# Patient Record
Sex: FEMALE | Race: BLACK OR AFRICAN AMERICAN | NOT HISPANIC OR LATINO | Employment: UNEMPLOYED | URBAN - METROPOLITAN AREA
[De-identification: names, ages, dates, MRNs, and addresses within clinical notes are randomized per-mention and may not be internally consistent; named-entity substitution may affect disease eponyms.]

---
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
## 2024-01-04 ENCOUNTER — TELEPHONE (OUTPATIENT)
Dept: PSYCHIATRY | Facility: CLINIC | Age: 19
End: 2024-01-04
Payer: OTHER GOVERNMENT

## 2024-01-04 NOTE — TELEPHONE ENCOUNTER
----- Message from Charis Childress, PhD, MPAP sent at 1/4/2024 10:54 AM CST -----  Regarding: reschedule  Please contact pt to reschedule her visit--we have a medicine section committee meeting that morning from 7-8, and they have requested that everyone attend in person or via zoom. My schedule is already blocked--I just need Jaia moved. Thanks!

## 2024-01-10 ENCOUNTER — TELEPHONE (OUTPATIENT)
Dept: PSYCHIATRY | Facility: CLINIC | Age: 19
End: 2024-01-10
Payer: OTHER GOVERNMENT

## 2024-01-12 ENCOUNTER — PATIENT MESSAGE (OUTPATIENT)
Dept: PSYCHIATRY | Facility: CLINIC | Age: 19
End: 2024-01-12

## 2024-01-12 ENCOUNTER — TELEPHONE (OUTPATIENT)
Dept: PSYCHIATRY | Facility: CLINIC | Age: 19
End: 2024-01-12
Payer: OTHER GOVERNMENT

## 2024-01-12 NOTE — TELEPHONE ENCOUNTER
----- Message from Charis Childress, PhD, MPAP sent at 1/12/2024 10:33 AM CST -----  Regarding: johan Frazier was late logging in for her visit--please call to johan. Thanks

## 2024-01-17 DIAGNOSIS — F41.9 ANXIETY: ICD-10-CM

## 2024-01-17 DIAGNOSIS — F33.1 MAJOR DEPRESSIVE DISORDER, RECURRENT, MODERATE: ICD-10-CM

## 2024-01-25 ENCOUNTER — TELEPHONE (OUTPATIENT)
Dept: PSYCHIATRY | Facility: CLINIC | Age: 19
End: 2024-01-25
Payer: OTHER GOVERNMENT

## 2024-01-29 ENCOUNTER — OFFICE VISIT (OUTPATIENT)
Dept: PSYCHIATRY | Facility: CLINIC | Age: 19
End: 2024-01-29
Payer: COMMERCIAL

## 2024-01-29 DIAGNOSIS — F90.2 ATTENTION DEFICIT HYPERACTIVITY DISORDER (ADHD), COMBINED TYPE: ICD-10-CM

## 2024-01-29 DIAGNOSIS — F33.1 MODERATE EPISODE OF RECURRENT MAJOR DEPRESSIVE DISORDER: Primary | ICD-10-CM

## 2024-01-29 DIAGNOSIS — F41.9 ANXIETY: ICD-10-CM

## 2024-01-29 PROCEDURE — 90863 PHARMACOLOGIC MGMT W/PSYTX: CPT | Mod: 95,,, | Performed by: PSYCHOLOGIST

## 2024-01-29 PROCEDURE — 90832 PSYTX W PT 30 MINUTES: CPT | Mod: 95,,, | Performed by: PSYCHOLOGIST

## 2024-01-29 RX ORDER — LISDEXAMFETAMINE DIMESYLATE 60 MG/1
60 CAPSULE ORAL EVERY MORNING
Qty: 30 CAPSULE | Refills: 0 | Status: SHIPPED | OUTPATIENT
Start: 2024-01-29 | End: 2024-03-16

## 2024-01-29 RX ORDER — ESCITALOPRAM OXALATE 10 MG/1
10 TABLET ORAL DAILY
Qty: 90 TABLET | Refills: 1 | Status: SHIPPED | OUTPATIENT
Start: 2024-01-29 | End: 2024-07-27

## 2024-01-29 RX ORDER — LISDEXAMFETAMINE DIMESYLATE 60 MG/1
60 CAPSULE ORAL EVERY MORNING
Qty: 30 CAPSULE | Refills: 0 | Status: SHIPPED | OUTPATIENT
Start: 2024-02-28 | End: 2024-03-29

## 2024-01-29 RX ORDER — LISDEXAMFETAMINE DIMESYLATE 60 MG/1
60 CAPSULE ORAL EVERY MORNING
Qty: 30 CAPSULE | Refills: 0 | Status: SHIPPED | OUTPATIENT
Start: 2024-03-29 | End: 2024-04-28

## 2024-01-29 RX ORDER — ESCITALOPRAM OXALATE 10 MG/1
10 TABLET ORAL
Qty: 30 TABLET | Refills: 2 | OUTPATIENT
Start: 2024-01-29

## 2024-01-29 NOTE — LETTER
January 29, 2024    Freddie Fenton  [address]  [place] [address]             [facility]  Psychiatry  [address] [facility] [address] [facility] [address] [place] [address]  Phone: [phone]  Fax: [phone]   January 29, 2024     Patient: Freddie Fenton   YOB: 2005   Date of Visit: 1/29/2024       To Whom it May Concern:    Freddie Fenton was seen in my clinic on 1/29/2024. She may return to school on 1/30/24 .    Please excuse her from any classes or work missed.    If you have any questions or concerns, please don't hesitate to call.    Sincerely,     Charis Childress, PhD, MPAP  Advanced Practice Medical Psychologist

## 2024-01-29 NOTE — PROGRESS NOTES
Outpatient Psychiatry Follow-Up Visit    1/29/2024    Virtual Visit    The patient location is: Patient's dorm/ Patient reported that his/her location at the time of this visit was in the [facility]     Visit type: Virtual visit with synchronous audio and video     Each patient to whom he or she provides medical services by telehealth is: (1) informed of the relationship between the medical psychologist and patient and the respective role of any other health care provider with respect to management of the patient; and (2) notified that he or she may decline to receive medical services by telehealth and may withdraw from such care at any time.    I also informed patient of the following:   Charis Childress, PhD, MPAP:  [place] medical license number: [id number]    My contact info:  [facility] / 2nd Floor  [address]  [place] [address]   Ph: [phone]    If technology issues, call office phone: Ph: [phone] or [phone]  If crisis: Dial 911 or go to nearest Emergency Room (ER)  If questions related to privacy practices: contact [facility] Information Department: [phone]    Chief Complaint:  Freddie Fenton is a 18 y.o. female who presents today for follow-up of depression, anxiety, and inattention/distractibility .       Preferred Name: Freddie  Gender Identity: cis female  Preferred Pronouns: she/her    Impressions/Plan from last visit: Freddie (Deonte--a) attended her visit.  She has completely resigned from school--is appealing to get her TOPS back. After our last visit, she saw the counselor on campus and was taken to the hospital for evaluation. She was not hospitalized. She told her parents--her dad was reportedly upset that she had not told them that she had gone to the hospital. Her mom has not really said much. Her mom reportedly wants her to stay at home and commute; she wants to stay on campus so she will have more social interactions. 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 Her boyfriend is reportedly working two jobs--she said that they have discussed her leaving her phone on campus and staying with him on Thursday nights, then going home on the weekend--twice a month.     We worked on a letter for an exception. Please see attached.    We also discussed sleep difficulty. Her schedule has been off--also not eating well. She has relapsed with cutting--has cut twice since her hospital evaluation--the first time after the hospital; the second time was two nights ago. She pulled up her sleeve to show faint lines on the inside of her left arm. She denied active thoughts of killing herself--has had "what if" thoughts--denied a plan or intent. She also brought up problems with eating--has vomited some after eating; later said that she has binged. Vyvanse decreases her appetite, and she is used to going long periods without eating. She reported that she has not been taking Vyvanse--her mom has no trust in her. She has been in trouble for vaping, lying, etc. She is not able to leave her house on her own, even to walk in the neighborhood. There are multiple factors contributing to her current levels of distress, some of which are due to her own doing. See plan below.    Plan--continue Vyvanse 60 mg (no scripts needed); continue Lexapro 10 mg; sleep hygiene--get sleep schedule back on track; https://www.veterantraining.[place].gov[place]sleep[place]index.asp; https://[employer]Worlds.[place].gov/steve/cbt-i-    Interval History and Content of Current Session: Freddie (Deonte--eber) attended her virtual visit. She reported that she has been sleeping a lot. 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 Her sleep cycle was reversed. She is back in school--has an 8 am class and 9:30 am on other days. She has been doing better with it. She reported that she still feels well-rested. She recently restarted Vyvanse. She reported that she " "sometimes forgets to take Lexapro because of her sleep schedule. She said that she looked at the [facility] resources--helped some. Freddie reported that she thinks that some of her trouble is because her mom got rid of her dog and she misses her. She and her mom are getting along "sometimes". She is "hot and cold" sometimes. Freddie and her boyfriend are still together, though they don't get to see each other. She reported that when she would ask to see him, her mom would say "yes."  He has met the rest of her family at [facility]. She believes that her mood/depression is up and down--sometimes mild and other times moderate. She is planning to get counseling on campus at [facility]. She reported that she has been binge-eating a little more--we discussed taking Vyvanse daily rather than just for classes. She asked about her symptoms worsening for now having her dog--discussed other things she could do like talk to a friend, go for a walk, volunteer at a shelter, etc. See plan below.    Plan--continue Lexapro 10 mg; Vyvanse 60 mg (sent 3 scripts); sleep hygiene    Mom (Tito)  Dad (Yobani)     since October 2023.        PSYCHOTHERAPY      Site: [facility]  Time: 16 minutes  Participants: Met with patient    Therapeutic Intervention Type: behavior modifying psychotherapy, supportive psychotherapy  Why chosen therapy is appropriate versus another modality: patient responds to this modality, evidence based practice    Target symptoms: depression, lack of focus, anxiety , relational  Primary focus: see above    Outcome monitoring methods: self-report, observation    Patient's response to intervention:  The patient's response to intervention is accepting.    Progress toward goals:  The patient's progress toward goals is fair .  ---------------------------------------------------------------------------------------------------  Prior medicines: ZoloftHeverictal        1/29/2024    10:39 AM 1/8/2024    11:30 AM " 11/14/2023     4:01 PM   GAD7   1. Feeling nervous, anxious, or on edge? 3 3 3   2. Not being able to stop or control worrying? 3 3 3   3. Worrying too much about different things? 3 3 3   4. Trouble relaxing? 1 3 3   5. Being so restless that it is hard to sit still? 1 0 0   6. Becoming easily annoyed or irritable? 1 3 0   7. Feeling afraid as if something awful might happen? 3 3 3   JOE-7 Score 15 18 15      0-4 = Minimal anxiety  5-9 = Mild anxiety  10-14 = Moderate anxiety  15-21 = Severe anxiety           10/26/2021     4:00 PM   Depression Patient Health Questionnaire   Over the last two weeks how often have you been bothered by little interest or pleasure in doing things Not at all   Over the last two weeks how often have you been bothered by feeling down, depressed or hopeless Several days   PHQ-2 Total Score 1   Over the last two weeks how often have you been bothered by trouble falling or staying asleep, or sleeping too much Several days   Over the last two weeks how often have you been bothered by feeling tired or having little energy Not at all   Over the last two weeks how often have you been bothered by a poor appetite or overeating Nearly every day   Over the last two weeks how often have you been bothered by feeling bad about yourself - or that you are a failure or have let yourself or your family down More than half the days   Over the last two weeks how often have you been bothered by trouble concentrating on things, such as reading the newspaper or watching television Not at all   Over the last two weeks how often have you been bothered by moving or speaking so slowly that other people could have noticed. Or the opposite - being so fidgety or restless that you have been moving around a lot more than usual. More than half the days   Over the last two weeks how often have you been bothered by thoughts that you would be better off dead, or of hurting yourself Several days   If you checked off any  "problems, how difficult have these problems made it for you to do your work, take care of things at home or get along with other people? Not difficult at all   PHQ-9 Score 10   PHQ-9 Interpretation Moderate     0-4 = No intervention  5 to 9 = Mild  10 to 14 = Moderate  15 to 19 = Moderately severe  =20 = Severe    Review of Systems   PSYCHIATRIC: Pertinant items are noted in the narrative.    Past Medical, Family and Social History: The patient's past medical, family and social history have been reviewed and updated as appropriate within the electronic medical record - see encounter notes.      Current Outpatient Medications:     EScitalopram oxalate (LEXAPRO) 10 MG tablet, Take 1 tablet (10 mg total) by mouth once daily., Disp: 30 tablet, Rfl: 2    lisdexamfetamine (VYVANSE) 60 MG capsule, Take 1 capsule (60 mg total) by mouth every morning., Disp: 30 capsule, Rfl: 0    norgestimate-ethinyl estradioL (ORTHO TRI-CYCLEN LO) 0.18/0.215/0.25 mg-25 mcg tablet, Take 1 tablet by mouth once daily., Disp: 30 tablet, Rfl: 11    Compliance: partial    Side effects: see above    Risk Parameters:  Patient reports no suicidal ideation  Patient reports no homicidal ideation  Patient reports no self-injurious behavior  Patient reports no violent behavior    Exam (detailed: at least 9 elements; comprehensive: all 15 elements)   Constitutional  Vitals:  Most recent vital signs were reviewed.   Last 3 sets of Vitals        8/10/2023     4:10 PM 11/15/2023     8:24 AM 12/21/2023    10:16 AM   Vitals - 1 value per visit   SYSTOLIC 120 118 110   DIASTOLIC 74 84 80   Pulse 119 75 79   Temp 99.1 °F (37.3 °C)  97.4 °F (36.3 °C)   SPO2 99 %  97 %   Weight (lb) 163.58 172.62 177.25   Weight (kg) 74.2 78.3 80.4   Height 5' 7.5" (1.715 m)  5' 7" (1.702 m)   BMI (Calculated) 25.2  27.8   Pain Score Zero  Zero          General:  age appropriate, casually dressed, neatly groomed, scarf over head/hair     Musculoskeletal  Muscle Strength/Tone:  no " tremor, no tic   Gait & Station:  video visit     Psychiatric  Speech:  no latency; no press   Behavior: wnl   Mood & Affect:  okay  congruent and appropriate   Thought Process:  normal and logical   Associations:  intact   Thought Content:  normal, no suicidality, no homicidality, delusions, or paranoia   Insight:  has awareness of illness   Judgement: behavior is adequate to circumstances   Orientation:  grossly intact   Memory: intact for content of interview   Language: grossly intact   Attention Span & Concentration:  Grossly intact   Fund of Knowledge:  intact and appropriate to age and level of education     Assessment and Diagnosis   Status/Progress: Based on the examination today, the patient's problem(s) is/are adequately but not ideally controlled.  New problems have not been presented today.   Co-morbidities and psychosocial stressors  are complicating management of the primary condition.  There are no active rule-out diagnoses for this patient at this time.     General Impression:     Encounter Diagnoses   Name Primary?    Moderate episode of recurrent major depressive disorder Yes    Attention deficit hyperactivity disorder (ADHD), combined type     Anxiety        Intervention/Counseling/Treatment Plan   Medication Management: Discussed risks, benefits, and alternatives to treatment plan documented above with patient. I answered all patient questions related to this plan, and patient expressed understanding and agreement.   continue Lexapro 10 mg; Vyvanse 60 mg (sent 3 scripts)  Counseling--will go on campus    Medication List with Changes/Refills   Current Medications    ESCITALOPRAM OXALATE (LEXAPRO) 10 MG TABLET    Take 1 tablet (10 mg total) by mouth once daily.    LISDEXAMFETAMINE (VYVANSE) 60 MG CAPSULE    Take 1 capsule (60 mg total) by mouth every morning.    NORGESTIMATE-ETHINYL ESTRADIOL (ORTHO TRI-CYCLEN LO) 0.18/0.215/0.25 MG-25 MCG TABLET    Take 1 tablet by mouth once daily.        Return  to Clinic: as scheduled    I spent an additional 9 minutes performing E/M services with >50% spent on counseling, guidance, coordinating care (not Psychotherapy related) in addition to the 16 minutes performing Psychotherapy.    Time spent with pt including note preparation: 25 minutes       Charis Childress, PhD, MP  Advanced Practice Medical Psychologist  [facility]  [address] [facility] [address]  [place] [address]  [phone]   [phone] fax

## 2024-01-29 NOTE — PATIENT INSTRUCTIONS

## 2024-02-13 ENCOUNTER — TELEPHONE (OUTPATIENT)
Dept: PSYCHIATRY | Facility: CLINIC | Age: 19
End: 2024-02-13
Payer: OTHER GOVERNMENT

## 2024-02-19 ENCOUNTER — OFFICE VISIT (OUTPATIENT)
Dept: PSYCHIATRY | Facility: CLINIC | Age: 19
End: 2024-02-19
Payer: OTHER GOVERNMENT

## 2024-02-19 DIAGNOSIS — F33.1 MODERATE EPISODE OF RECURRENT MAJOR DEPRESSIVE DISORDER: Primary | ICD-10-CM

## 2024-02-19 DIAGNOSIS — F90.2 ATTENTION DEFICIT HYPERACTIVITY DISORDER (ADHD), COMBINED TYPE: ICD-10-CM

## 2024-02-19 DIAGNOSIS — F41.9 ANXIETY: ICD-10-CM

## 2024-02-19 PROCEDURE — 90832 PSYTX W PT 30 MINUTES: CPT | Mod: 95,,, | Performed by: PSYCHOLOGIST

## 2024-02-19 PROCEDURE — 90863 PHARMACOLOGIC MGMT W/PSYTX: CPT | Mod: 95,,, | Performed by: PSYCHOLOGIST

## 2024-02-19 RX ORDER — TRAZODONE HYDROCHLORIDE 50 MG/1
50 TABLET ORAL NIGHTLY
Qty: 30 TABLET | Refills: 2 | Status: SHIPPED | OUTPATIENT
Start: 2024-02-19 | End: 2024-05-19

## 2024-02-19 NOTE — PATIENT INSTRUCTIONS

## 2024-02-19 NOTE — PROGRESS NOTES
"Outpatient Psychiatry Follow-Up Visit    2/19/2024      Virtual Visit    The patient location is: Patient's dorm/ Patient reported that his/her location at the time of this visit was in the [facility]     Visit type: Virtual visit with synchronous audio and video     Each patient to whom he or she provides medical services by telehealth is: (1) informed of the relationship between the medical psychologist and patient and the respective role of any other health care provider with respect to management of the patient; and (2) notified that he or she may decline to receive medical services by telehealth and may withdraw from such care at any time.    I also informed patient of the following:   Charis Childress, PhD, MPAP:  [place] medical license number: [id number]    My contact info:  [facility] / 2nd Floor  [address]  [place] [address]   Ph: [phone]    If technology issues, call office phone: Ph: [phone] or [phone]  If crisis: Dial 911 or go to nearest Emergency Room (ER)  If questions related to privacy practices: contact [facility] Information Department: [phone]    Chief Complaint:  Freddie Fenton is a 18 y.o. female who presents today for follow-up of depression, anxiety, and inattention/distractibility .       Preferred Name: Freddie  Gender Identity: cis female  Preferred Pronouns: she/her    Impressions/Plan from last visit: Freddie (Deonte--beer) attended her virtual visit. She reported that she has been sleeping a lot. 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 Her sleep cycle was reversed. She is back in school--has an 8 am class and 9:30 am on other days. She has been doing better with it. She reported that she still feels well-rested. She recently restarted Vyvanse. She reported that she sometimes forgets to take Lexapro because of her sleep schedule. She said that she [facility]--[facility] some. Freddie " "reported that she thinks that some of her trouble is because her mom got rid of her dog and she misses her. She and her mom are getting along "sometimes". She is "hot and cold" sometimes. Freddie and her boyfriend are still together, though they don't get to see each other. She reported that when she would ask to see him, her mom would say "yes."  He has met the rest of her family at [facility]. She believes that her mood/depression is up and down--sometimes mild and other times moderate. She is planning to get counseling on campus at [facility]. She reported that she has been binge-eating a little more--we discussed taking Vyvanse daily rather than just for classes. She asked about her symptoms worsening for now having her dog--discussed other things she could do like talk to a friend, go for a walk, volunteer at a shelter, etc. See plan below.    Plan--continue Lexapro 10 mg; Vyvanse 60 mg (sent 3 scripts); sleep hygiene    Interval History and Content of Current Session: Freddie (Deonte--a) attended her virtual visit. She is back on campus and has started classes. She reported that she is sometimes not getting up for her 8 am classes (Mondays and Wednesdays). She reported that she has a 9:30 class everyday--is up by then. She said that she is taking her medicine, but sometimes feels sick. Anxiety is still very high. She has had trouble with sleep sometimes--still takes hydroxyzine sometimes but it's not always helpful.  We discussed the drug drug interaction of Lexapro and Vistaril, and we agreed that she will try trazodone instead when needed.  There are also drug drug interactions with Lexapro and trazodone, but not the cardiac influence.  We spent some time discussing sleep hygiene in general.  When she does go to sleep, she does not usually have a problem staying asleep for at least 7 hours.  Her schedule is just off on some days. She reported that she is not eating regularly--we talked about regulating her food " habits. She said that she will often go without eating at all. When she would try to eat, she would get nauseated and vomit. She admitted to making herself vomit about once a month before when she would over-eat. She said that she was sick last week over Mardi Gras and did make herself vomit last Wednesday.  She said that she thought in December she might be pregnant and took an over-the-counter test, as well as saw medical and took a blood test.  They were negative, and she denied having unprotected sex since that time.  We also spent time discussing eating habits and if this continues to be an issue, she will reach out to medical for further testing.  See plan below.    Plan--continue Lexapro 10 mg; Vyvanse 60 mg (no script needed--will try a lower dose by dissolving); trial of trazodone 50 mg hs prn; sleep hygiene; message me about Vyvanse dose    Mom (Tito)   Dad (Yobani)     since October 2023.        PSYCHOTHERAPY      Site: [facility]  Time: 16 minutes  Participants: Met with patient    Therapeutic Intervention Type: behavior modifying psychotherapy, supportive psychotherapy  Why chosen therapy is appropriate versus another modality: patient responds to this modality, evidence based practice    Target symptoms: depression, lack of focus, anxiety , sleep  Primary focus: see above    Outcome monitoring methods: self-report, observation    Patient's response to intervention:  The patient's response to intervention is accepting.    Progress toward goals:  The patient's progress toward goals is fair .  ---------------------------------------------------------------------------------------------------  Prior medicines: Zoloft, Lamictal        2/19/2024     2:02 AM 2/15/2024    11:45 AM 1/29/2024    10:39 AM   GAD7   1. Feeling nervous, anxious, or on edge? 3 3 3   2. Not being able to stop or control worrying? 3 3 3   3. Worrying too much about different things? 3 3 3   4. Trouble relaxing? 3 3  1   5. Being so restless that it is hard to sit still? 3 3 1   6. Becoming easily annoyed or irritable? 3 3 1   7. Feeling afraid as if something awful might happen? 3 3 3   JOE-7 Score 21 21 15      0-4 = Minimal anxiety  5-9 = Mild anxiety  10-14 = Moderate anxiety  15-21 = Severe anxiety           10/26/2021     4:00 PM   Depression Patient Health Questionnaire   Over the last two weeks how often have you been bothered by little interest or pleasure in doing things Not at all   Over the last two weeks how often have you been bothered by feeling down, depressed or hopeless Several days   PHQ-2 Total Score 1   Over the last two weeks how often have you been bothered by trouble falling or staying asleep, or sleeping too much Several days   Over the last two weeks how often have you been bothered by feeling tired or having little energy Not at all   Over the last two weeks how often have you been bothered by a poor appetite or overeating Nearly every day   Over the last two weeks how often have you been bothered by feeling bad about yourself - or that you are a failure or have let yourself or your family down More than half the days   Over the last two weeks how often have you been bothered by trouble concentrating on things, such as reading the newspaper or watching television Not at all   Over the last two weeks how often have you been bothered by moving or speaking so slowly that other people could have noticed. Or the opposite - being so fidgety or restless that you have been moving around a lot more than usual. More than half the days   Over the last two weeks how often have you been bothered by thoughts that you would be better off dead, or of hurting yourself Several days   If you checked off any problems, how difficult have these problems made it for you to do your work, take care of things at home or get along with other people? Not difficult at all   PHQ-9 Score 10   PHQ-9 Interpretation Moderate     0-4 =  "No intervention  5 to 9 = Mild  10 to 14 = Moderate  15 to 19 = Moderately severe  =20 = Severe    Review of Systems   PSYCHIATRIC: Pertinant items are noted in the narrative.    Past Medical, Family and Social History: The patient's past medical, family and social history have been reviewed and updated as appropriate within the electronic medical record - see encounter notes.      Current Outpatient Medications:     EScitalopram oxalate (LEXAPRO) 10 MG tablet, Take 1 tablet (10 mg total) by mouth once daily., Disp: 90 tablet, Rfl: 1    lisdexamfetamine (VYVANSE) 60 MG capsule, Take 1 capsule (60 mg total) by mouth every morning., Disp: 30 capsule, Rfl: 0    [START ON 2/28/2024] lisdexamfetamine (VYVANSE) 60 MG capsule, Take 1 capsule (60 mg total) by mouth every morning., Disp: 30 capsule, Rfl: 0    [START ON 3/29/2024] lisdexamfetamine (VYVANSE) 60 MG capsule, Take 1 capsule (60 mg total) by mouth every morning., Disp: 30 capsule, Rfl: 0    norgestimate-ethinyl estradioL (ORTHO TRI-CYCLEN LO) 0.18/0.215/0.25 mg-25 mcg tablet, Take 1 tablet by mouth once daily., Disp: 30 tablet, Rfl: 11    Compliance: yes    Side effects: see above    Risk Parameters:  Patient reports no suicidal ideation  Patient reports no homicidal ideation  Patient reports no self-injurious behavior  Patient reports no violent behavior    Exam (detailed: at least 9 elements; comprehensive: all 15 elements)   Constitutional  Vitals:  Most recent vital signs were reviewed.   Last 3 sets of Vitals        8/10/2023     4:10 PM 11/15/2023     8:24 AM 12/21/2023    10:16 AM   Vitals - 1 value per visit   SYSTOLIC 120 118 110   DIASTOLIC 74 84 80   Pulse 119 75 79   Temp 99.1 °F (37.3 °C)  97.4 °F (36.3 °C)   SPO2 99 %  97 %   Weight (lb) 163.58 172.62 177.25   Weight (kg) 74.2 78.3 80.4   Height 5' 7.5" (1.715 m)  5' 7" (1.702 m)   BMI (Calculated) 25.2  27.8   Pain Score Zero  Zero          General:  age appropriate, casually dressed, neatly groomed "     Musculoskeletal  Muscle Strength/Tone:  no tremor, no tic   Gait & Station:  video visit     Psychiatric  Speech:  no latency; no press   Behavior: wnl   Mood & Affect:  anxious, a little sick  congruent and appropriate   Thought Process:  normal and logical   Associations:  intact   Thought Content:  normal, no suicidality, no homicidality, delusions, or paranoia   Insight:  has awareness of illness   Judgement: behavior is adequate to circumstances   Orientation:  grossly intact   Memory: intact for content of interview   Language: grossly intact   Attention Span & Concentration:  Grossly intact   Fund of Knowledge:  intact and appropriate to age and level of education     Assessment and Diagnosis   Status/Progress: Based on the examination today, the patient's problem(s) is/are adequately but not ideally controlled.  New problems have been presented today.   Co-morbidities and psychosocial stressors  are complicating management of the primary condition.  There are no active rule-out diagnoses for this patient at this time.     General Impression:     Encounter Diagnoses   Name Primary?    Moderate episode of recurrent major depressive disorder Yes    Attention deficit hyperactivity disorder (ADHD), combined type     Anxiety        Intervention/Counseling/Treatment Plan   Medication Management: Discussed risks, benefits, and alternatives to treatment plan documented above with patient. I answered all patient questions related to this plan, and patient expressed understanding and agreement.   continue Lexapro 10 mg; Vyvanse 60 mg (no script needed--will try a lower dose by dissolving); trial of trazodone 50 mg hs prn  Counseling--will go on campus  sleep hygiene;  message me about Vyvanse dose    Medication List with Changes/Refills   Current Medications    ESCITALOPRAM OXALATE (LEXAPRO) 10 MG TABLET    Take 1 tablet (10 mg total) by mouth once daily.    LISDEXAMFETAMINE (VYVANSE) 60 MG CAPSULE    Take 1  capsule (60 mg total) by mouth every morning.    LISDEXAMFETAMINE (VYVANSE) 60 MG CAPSULE    Take 1 capsule (60 mg total) by mouth every morning.    LISDEXAMFETAMINE (VYVANSE) 60 MG CAPSULE    Take 1 capsule (60 mg total) by mouth every morning.    NORGESTIMATE-ETHINYL ESTRADIOL (ORTHO TRI-CYCLEN LO) 0.18/0.215/0.25 MG-25 MCG TABLET    Take 1 tablet by mouth once daily.        Return to Clinic: 2 months    I spent an additional 20 minutes performing E/M services with >50% spent on counseling, guidance, coordinating care (not Psychotherapy related) in addition to the 16 minutes performing Psychotherapy.    Time spent with pt including note preparation: 36 minutes       Charis Childress, PhD, MP  Advanced Practice Medical Psychologist  [facility]  [address]  [place] [address]  [phone]   [phone] fax

## 2024-04-12 DIAGNOSIS — Z11.8 SCREENING FOR CHLAMYDIAL DISEASE: Primary | ICD-10-CM

## 2024-05-20 ENCOUNTER — PATIENT MESSAGE (OUTPATIENT)
Dept: PSYCHIATRY | Facility: CLINIC | Age: 19
End: 2024-05-20

## 2024-05-20 ENCOUNTER — OFFICE VISIT (OUTPATIENT)
Dept: PSYCHIATRY | Facility: CLINIC | Age: 19
End: 2024-05-20
Payer: OTHER GOVERNMENT

## 2024-05-20 DIAGNOSIS — F33.1 MODERATE EPISODE OF RECURRENT MAJOR DEPRESSIVE DISORDER: Primary | ICD-10-CM

## 2024-05-20 DIAGNOSIS — F90.2 ATTENTION DEFICIT HYPERACTIVITY DISORDER (ADHD), COMBINED TYPE: ICD-10-CM

## 2024-05-20 DIAGNOSIS — F41.9 ANXIETY: ICD-10-CM

## 2024-05-20 PROCEDURE — 90863 PHARMACOLOGIC MGMT W/PSYTX: CPT | Mod: 95,,, | Performed by: PSYCHOLOGIST

## 2024-05-20 PROCEDURE — 90832 PSYTX W PT 30 MINUTES: CPT | Mod: 95,,, | Performed by: PSYCHOLOGIST
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
## 2024-05-20 NOTE — PATIENT INSTRUCTIONS

## 2024-06-25 ENCOUNTER — PATIENT MESSAGE (OUTPATIENT)
Dept: PSYCHIATRY | Facility: CLINIC | Age: 19
End: 2024-06-25

## 2024-06-26 ENCOUNTER — OFFICE VISIT (OUTPATIENT)
Dept: PSYCHIATRY | Facility: CLINIC | Age: 19
End: 2024-06-26
Payer: COMMERCIAL

## 2024-06-26 ENCOUNTER — TELEPHONE (OUTPATIENT)
Dept: PSYCHIATRY | Facility: CLINIC | Age: 19
End: 2024-06-26

## 2024-06-26 DIAGNOSIS — F41.9 ANXIETY: ICD-10-CM

## 2024-06-26 DIAGNOSIS — F90.2 ATTENTION DEFICIT HYPERACTIVITY DISORDER (ADHD), COMBINED TYPE: ICD-10-CM

## 2024-06-26 DIAGNOSIS — F33.1 MODERATE EPISODE OF RECURRENT MAJOR DEPRESSIVE DISORDER: Primary | ICD-10-CM

## 2024-06-26 DIAGNOSIS — R40.4 STARING EPISODES: ICD-10-CM

## 2024-06-26 PROCEDURE — 90832 PSYTX W PT 30 MINUTES: CPT | Mod: 95,,, | Performed by: PSYCHOLOGIST

## 2024-06-26 PROCEDURE — 90863 PHARMACOLOGIC MGMT W/PSYTX: CPT | Mod: 95,,, | Performed by: PSYCHOLOGIST

## 2024-06-26 RX ORDER — ESCITALOPRAM OXALATE 10 MG/1
15 TABLET ORAL DAILY
Qty: 45 TABLET | Refills: 2 | Status: SHIPPED | OUTPATIENT
Start: 2024-06-26 | End: 2024-09-24

## 2024-06-26 RX ORDER — TRAZODONE HYDROCHLORIDE 50 MG/1
50 TABLET ORAL NIGHTLY
Qty: 30 TABLET | Refills: 2 | Status: SHIPPED | OUTPATIENT
Start: 2024-06-26 | End: 2024-09-24

## 2024-06-26 RX ORDER — LISDEXAMFETAMINE DIMESYLATE 60 MG/1
60 CAPSULE ORAL EVERY MORNING
Qty: 30 CAPSULE | Refills: 0 | Status: SHIPPED | OUTPATIENT
Start: 2024-07-26 | End: 2024-08-25

## 2024-06-26 RX ORDER — LISDEXAMFETAMINE DIMESYLATE 60 MG/1
60 CAPSULE ORAL EVERY MORNING
Qty: 30 CAPSULE | Refills: 0 | Status: SHIPPED | OUTPATIENT
Start: 2024-08-25 | End: 2024-09-24

## 2024-06-26 RX ORDER — LISDEXAMFETAMINE DIMESYLATE 60 MG/1
60 CAPSULE ORAL EVERY MORNING
Qty: 30 CAPSULE | Refills: 0 | Status: SHIPPED | OUTPATIENT
Start: 2024-06-26 | End: 2024-07-26
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
## 2024-06-26 NOTE — TELEPHONE ENCOUNTER
----- Message from Charis Childress, PhD, MPAP sent at 6/26/2024  3:04 PM CDT -----  Regarding: schedule  Please contact pt about scheduling with me in 6-8 weeks for a 30 min virtual visit.    thanks  
skill demonstration/individual instruction/verbal instruction

## 2024-06-26 NOTE — PATIENT INSTRUCTIONS

## 2024-08-21 NOTE — PROGRESS NOTES
"Subjective:      Patient ID: Freddie Fenton is a 18 y.o. female.    Chief Complaint:  " Seizures ".     HPI 18 Years old AA Female with PMHx of ADHD / Staring spells and others non pertinent Medical issues came for the evaluation and recommendation of " Seizures ".      Started: 2 months ago.   Describes: " staring ".   Timing: 15 - 20 seconds.   Frequency: twice.   Pain:  none.   Location: CNS.   Family: No Seizures.   Medications: Lexapro /Vyvanse / Trazodone.   Worsen: none.   Alleviated: by itself.   Associated symptoms: " eyes rolled back " / " daydreaming " /   Triggers: none.   Prodrome symptoms: none.          Referral by PCP.        Last spell July 2024.        No shaking.        No LOC or awareness.        Had a convulsion after an overdose - " Hydroxysizine ".           Review of Systems   Neurological:  Positive for seizures.   All other systems reviewed and are negative.    Objective:     Neurologic Exam     Mental Status   Oriented to person, place, and time.   Registration: recalls 3 of 3 objects. Recall at 5 minutes: recalls 3 of 3 objects. Follows 3 step commands.   Attention: normal. Concentration: normal.   Speech: speech is normal   Level of consciousness: alert  Knowledge: good. Able to perform simple calculations.   Able to name object. Able to read. Able to write. Normal comprehension.     Cranial Nerves   Cranial nerves II through XII intact.     CN III, IV, VI   Pupils are equal, round, and reactive to light.    Motor Exam   Muscle bulk: normal  Overall muscle tone: normal    Strength   Strength 5/5 throughout.   Right neck flexion: 5/5  Left neck flexion: 5/5  Right neck extension: 5/5  Left neck extension: 5/5  Right deltoid: 5/5  Left deltoid: 5/5  Right biceps: 5/5  Left biceps: 5/5  Right triceps: 5/5  Left triceps: 5/5  Right wrist flexion: 5/5  Left wrist flexion: 5/5  Right wrist extension: 5/5  Left wrist extension: 5/5  Right interossei: 5/5  Left interossei: 5/5  Right " abdominals: 5/5  Left abdominals: 5/5  Right iliopsoas: 5/5  Left iliopsoas: 5/5  Right quadriceps: 5/5  Left quadriceps: 5/5  Right hamstrin/5  Left hamstrin/5  Right glutei: 5/5  Left glutei: 5/5  Right anterior tibial: 5/5  Left anterior tibial: 5/5  Right posterior tibial: 5/5  Left posterior tibial: 5/5  Right peroneal: 5/5  Left peroneal: 5/5  Right gastroc: 5/5  Left gastroc: 5/5    Sensory Exam   Light touch normal.   Vibration normal.   Proprioception normal.   Pinprick normal.   Graphesthesia: normal  Stereognosis: normal    Gait, Coordination, and Reflexes     Gait  Gait: normal    Coordination   Romberg: negative  Finger to nose coordination: normal  Heel to shin coordination: normal  Tandem walking coordination: normal    Tremor   Resting tremor: absent  Intention tremor: absent  Action tremor: absent    Reflexes   Right brachioradialis: 2+  Left brachioradialis: 2+  Right biceps: 2+  Left biceps: 2+  Right triceps: 2+  Left triceps: 2+  Right patellar: 2+  Left patellar: 2+  Right achilles: 2+  Left achilles: 2+  Right : 2+  Left : 2+  Right plantar: normal  Left plantar: normal  Right Wilburn: absent  Left Wilburn: absent  Right ankle clonus: absent  Left ankle clonus: absent  Right pendular knee jerk: absent  Left pendular knee jerk: absent      Physical Exam  Vitals and nursing note reviewed.   Constitutional:       Appearance: Normal appearance. She is normal weight.   HENT:      Head: Normocephalic and atraumatic.      Right Ear: Tympanic membrane normal.      Left Ear: Tympanic membrane normal.      Nose: Nose normal.      Mouth/Throat:      Mouth: Mucous membranes are moist.      Pharynx: Oropharynx is clear.   Eyes:      Extraocular Movements: Extraocular movements intact.      Pupils: Pupils are equal, round, and reactive to light.   Cardiovascular:      Rate and Rhythm: Normal rate and regular rhythm.   Abdominal:      General: Abdomen is flat. Bowel sounds are normal.       "Palpations: Abdomen is soft.   Genitourinary:     Comments: Deferred.   Musculoskeletal:         General: Normal range of motion.      Cervical back: Normal range of motion and neck supple.   Skin:     General: Skin is warm and dry.      Capillary Refill: Capillary refill takes less than 2 seconds.   Neurological:      Mental Status: She is alert and oriented to person, place, and time. Mental status is at baseline.      Cranial Nerves: Cranial nerves 2-12 are intact.      Motor: Motor strength is normal.     Coordination: Finger-Nose-Finger Test, Heel to Shin Test and Romberg Test normal.      Gait: Gait is intact. Tandem walk normal.      Deep Tendon Reflexes:      Reflex Scores:       Tricep reflexes are 2+ on the right side and 2+ on the left side.       Bicep reflexes are 2+ on the right side and 2+ on the left side.       Brachioradialis reflexes are 2+ on the right side and 2+ on the left side.       Patellar reflexes are 2+ on the right side and 2+ on the left side.       Achilles reflexes are 2+ on the right side and 2+ on the left side.  Psychiatric:         Mood and Affect: Mood normal.         Speech: Speech normal.         Behavior: Behavior normal.         Thought Content: Thought content normal.         Judgment: Judgment normal.          Assessment:    18 Years old AA Female with PMHX as above came of the evaluation of " Seizures ".   - Staring spells.   Plan:   Patient Neurological Assessment is non focal.     EEG routine.  No driving discussed.    Labs: CMP / TSH / Free T 4 / Ethanol U. / Drugs screen - 2023 - non significant abnormalities.     Please do not hesitate to contact me with any updates, questions or concerns.    Abraham Hart MD.  General Neurologist.   "

## 2024-08-22 ENCOUNTER — TELEPHONE (OUTPATIENT)
Dept: NEUROLOGY | Facility: CLINIC | Age: 19
End: 2024-08-22
Payer: OTHER GOVERNMENT

## 2024-08-23 ENCOUNTER — OFFICE VISIT (OUTPATIENT)
Dept: NEUROLOGY | Facility: CLINIC | Age: 19
End: 2024-08-23
Payer: OTHER GOVERNMENT

## 2024-08-23 VITALS
HEART RATE: 112 BPM | SYSTOLIC BLOOD PRESSURE: 128 MMHG | HEIGHT: 67 IN | WEIGHT: 191.56 LBS | DIASTOLIC BLOOD PRESSURE: 86 MMHG | BODY MASS INDEX: 30.07 KG/M2

## 2024-08-23 DIAGNOSIS — R40.4 STARING EPISODES: Primary | ICD-10-CM

## 2024-08-23 PROCEDURE — 99213 OFFICE O/P EST LOW 20 MIN: CPT | Mod: PBBFAC | Performed by: PSYCHIATRY & NEUROLOGY

## 2024-08-23 PROCEDURE — 99999 PR PBB SHADOW E&M-EST. PATIENT-LVL III: CPT | Mod: PBBFAC,,, | Performed by: PSYCHIATRY & NEUROLOGY

## 2024-08-23 RX ORDER — AMOXICILLIN 500 MG/1
500 CAPSULE ORAL EVERY 8 HOURS
COMMUNITY
Start: 2024-07-18

## 2024-08-23 RX ORDER — IBUPROFEN 800 MG/1
800 TABLET ORAL EVERY 8 HOURS PRN
COMMUNITY
Start: 2024-07-18

## 2024-09-01 ENCOUNTER — OFFICE VISIT (OUTPATIENT)
Dept: URGENT CARE | Facility: CLINIC | Age: 19
End: 2024-09-01
Payer: COMMERCIAL

## 2024-09-01 VITALS
HEIGHT: 67 IN | RESPIRATION RATE: 20 BRPM | HEART RATE: 110 BPM | DIASTOLIC BLOOD PRESSURE: 73 MMHG | WEIGHT: 185 LBS | SYSTOLIC BLOOD PRESSURE: 120 MMHG | OXYGEN SATURATION: 98 % | BODY MASS INDEX: 29.03 KG/M2 | TEMPERATURE: 99 F

## 2024-09-01 DIAGNOSIS — R50.9 ELEVATED TEMPERATURE: ICD-10-CM

## 2024-09-01 DIAGNOSIS — R10.84 GENERALIZED ABDOMINAL PAIN: ICD-10-CM

## 2024-09-01 DIAGNOSIS — R68.83 CHILLS: ICD-10-CM

## 2024-09-01 DIAGNOSIS — N39.0 URINARY TRACT INFECTION WITHOUT HEMATURIA, SITE UNSPECIFIED: Primary | ICD-10-CM

## 2024-09-01 DIAGNOSIS — R11.10 VOMITING IN ADULT: ICD-10-CM

## 2024-09-01 LAB
B-HCG UR QL: NEGATIVE
BILIRUBIN, UA POC OHS: NEGATIVE
BLOOD, UA POC OHS: ABNORMAL
CLARITY, UA POC OHS: CLEAR
COLOR, UA POC OHS: YELLOW
CTP QC/QA: YES
GLUCOSE, UA POC OHS: NEGATIVE
KETONES, UA POC OHS: 15
LEUKOCYTES, UA POC OHS: ABNORMAL
NITRITE, UA POC OHS: POSITIVE
PH, UA POC OHS: 6
PROTEIN, UA POC OHS: 100
SPECIFIC GRAVITY, UA POC OHS: 1.02
UROBILINOGEN, UA POC OHS: 2

## 2024-09-01 PROCEDURE — 99214 OFFICE O/P EST MOD 30 MIN: CPT | Mod: S$GLB,,, | Performed by: PHYSICIAN ASSISTANT

## 2024-09-01 PROCEDURE — 87186 SC STD MICRODIL/AGAR DIL: CPT | Performed by: PHYSICIAN ASSISTANT

## 2024-09-01 PROCEDURE — 87086 URINE CULTURE/COLONY COUNT: CPT | Performed by: PHYSICIAN ASSISTANT

## 2024-09-01 PROCEDURE — 81025 URINE PREGNANCY TEST: CPT | Mod: S$GLB,,, | Performed by: PHYSICIAN ASSISTANT

## 2024-09-01 PROCEDURE — 96372 THER/PROPH/DIAG INJ SC/IM: CPT | Mod: S$GLB,,, | Performed by: EMERGENCY MEDICINE

## 2024-09-01 PROCEDURE — 81003 URINALYSIS AUTO W/O SCOPE: CPT | Mod: QW,S$GLB,, | Performed by: PHYSICIAN ASSISTANT

## 2024-09-01 PROCEDURE — 87088 URINE BACTERIA CULTURE: CPT | Performed by: PHYSICIAN ASSISTANT

## 2024-09-01 RX ORDER — DICYCLOMINE HYDROCHLORIDE 20 MG/1
20 TABLET ORAL
Status: COMPLETED | OUTPATIENT
Start: 2024-09-01 | End: 2024-09-01

## 2024-09-01 RX ORDER — NITROFURANTOIN 25; 75 MG/1; MG/1
100 CAPSULE ORAL 2 TIMES DAILY
Qty: 10 CAPSULE | Refills: 0 | Status: SHIPPED | OUTPATIENT
Start: 2024-09-01 | End: 2024-09-06

## 2024-09-01 RX ORDER — LIDOCAINE HYDROCHLORIDE 10 MG/ML
2 INJECTION, SOLUTION INFILTRATION; PERINEURAL
Status: COMPLETED | OUTPATIENT
Start: 2024-09-01 | End: 2024-09-01

## 2024-09-01 RX ORDER — CEFTRIAXONE 500 MG/1
1 INJECTION, POWDER, FOR SOLUTION INTRAMUSCULAR; INTRAVENOUS ONCE
Status: COMPLETED | OUTPATIENT
Start: 2024-09-01 | End: 2024-09-01

## 2024-09-01 RX ADMIN — LIDOCAINE HYDROCHLORIDE 2 ML: 10 INJECTION, SOLUTION INFILTRATION; PERINEURAL at 12:09

## 2024-09-01 RX ADMIN — DICYCLOMINE HYDROCHLORIDE 20 MG: 20 TABLET ORAL at 12:09

## 2024-09-01 RX ADMIN — CEFTRIAXONE 1 G: 500 INJECTION, POWDER, FOR SOLUTION INTRAMUSCULAR; INTRAVENOUS at 12:09

## 2024-09-01 NOTE — PROGRESS NOTES
"Subjective:      Patient ID: Freddie Fenton is a 18 y.o. female.    Vitals:  height is 5' 7" (1.702 m) and weight is 83.9 kg (185 lb). Her oral temperature is 99 °F (37.2 °C). Her blood pressure is 120/73 and her pulse is 110. Her respiration is 20 and oxygen saturation is 98%.     Chief Complaint: Abdominal Pain    18 year old patient presents c/o of abd pain, pain after urinating, chills, and vomiting x 2-3 days. Pt denies any OTC medication. Pt believes she has  UTI.       Abdominal Pain  This is a new problem. The current episode started in the past 7 days. The onset quality is sudden. The problem has been unchanged. The pain is located in the generalized abdominal region. The pain is at a severity of 5/10. The pain is mild. The abdominal pain does not radiate. Associated symptoms include dysuria, a fever and vomiting. Pertinent negatives include no anorexia, arthralgias, belching, constipation, diarrhea, flatus, frequency, headaches, hematochezia, hematuria, melena, myalgias, nausea or weight loss. Nothing aggravates the pain. The pain is relieved by Nothing. She has tried nothing for the symptoms. There is no history of abdominal surgery, colon cancer, Crohn's disease, gallstones, GERD, irritable bowel syndrome, pancreatitis, PUD or ulcerative colitis. Patient's medical history does not include kidney stones and UTI.       Constitution: Positive for chills and fever.   Gastrointestinal:  Positive for abdominal pain and vomiting. Negative for history of abdominal surgery, nausea, constipation, diarrhea and bright red blood in stool.   Genitourinary:  Positive for dysuria. Negative for frequency and hematuria.   Musculoskeletal:  Negative for joint pain and muscle ache.   Neurological:  Negative for headaches.      Objective:     Vitals:    09/01/24 1157   BP: 120/73   Pulse: 110   Resp: 20   Temp: 99 °F (37.2 °C)   TempSrc: Oral   SpO2: 98%   Weight: 83.9 kg (185 lb)   Height: 5' 7" (1.702 m)       Physical " Exam   Constitutional: She is oriented to person, place, and time. She appears well-developed. She does not appear ill.   HENT:   Head: Normocephalic and atraumatic.   Ears:   Right Ear: External ear normal.   Left Ear: External ear normal.   Nose: Nose normal. No nasal deformity. No epistaxis.   Mouth/Throat: Oropharynx is clear and moist and mucous membranes are normal.   Eyes: Lids are normal.   Neck: Trachea normal and phonation normal. Neck supple.   Cardiovascular: Normal pulses.   Pulmonary/Chest: Effort normal.   Abdominal: Normal appearance and bowel sounds are normal. She exhibits no distension and no mass. Soft. flat abdomen There is generalized abdominal tenderness. There is no rebound, no guarding, no left CVA tenderness and no right CVA tenderness. No hernia.   Neurological: She is alert and oriented to person, place, and time.   Skin: Skin is warm, dry and intact.   Psychiatric: Her speech is normal and behavior is normal.   Nursing note and vitals reviewed.      Assessment:     1. Urinary tract infection without hematuria, site unspecified    2. Generalized abdominal pain    3. Vomiting in adult    4. Elevated temperature    5. Chills      Results for orders placed or performed in visit on 09/01/24   POCT Urinalysis(Instrument)   Result Value Ref Range    Color, POC UA Yellow Yellow, Straw, Colorless    Clarity, POC UA Clear Clear    Glucose, POC UA Negative Negative    Bilirubin, POC UA Negative Negative    Ketones, POC UA 15 (A) Negative    Spec Grav POC UA 1.020 1.005 - 1.030    Blood, POC UA Moderate (A) Negative    pH, POC UA 6.0 5.0 - 8.0    Protein, POC  (A) Negative    Urobilinogen, POC UA 2.0 (A) <=1.0    Nitrite, POC UA Positive (A) Negative    WBC, POC UA Moderate (A) Negative   POCT urine pregnancy   Result Value Ref Range    POC Preg Test, Ur Negative Negative     Acceptable Yes        Plan:       Urinary tract infection without hematuria, site unspecified  -      nitrofurantoin, macrocrystal-monohydrate, (MACROBID) 100 MG capsule; Take 1 capsule (100 mg total) by mouth 2 (two) times daily. for 5 days  Dispense: 10 capsule; Refill: 0  -     Urine culture  -     cefTRIAXone injection 1 g  -     LIDOcaine HCL 10 mg/ml (1%) injection 2 mL    Generalized abdominal pain  -     POCT Urinalysis(Instrument)  -     POCT urine pregnancy    Vomiting in adult  -     dicyclomine tablet 20 mg    Elevated temperature  -     cefTRIAXone injection 1 g    Chills  -     cefTRIAXone injection 1 g          Medical Decision Making:   Initial Assessment:   VSS  NO FLANK PAIN   + CHILLS AND FEVER   Vomiting    Here with mom   Clinical Tests:   Lab Tests: Ordered and Reviewed  The following lab test(s) were unremarkable: UPT  Urgent Care Management:  See entire note for further details    Discussed with pt all pertinent UC information and results. Discussed pt dx and plan of tx.   Gave pt all f/u and return to the UC instructions. All questions and concerns were addressed at this time.   Pt expresses understanding of information and instructions, and is comfortable with plan to discharge.   Pt is stable for discharge.     I discussed with patient and/or family/caretaker that evaluation in the UC does not suggest any emergent or life threatening medical conditions requiring immediate intervention beyond what was provided in the UC, and I believe patient is safe for discharge.  Regardless, an unremarkable evaluation in the UC does not preclude the development or presence of a serious or life threatening condition. As such, patient was instructed to GO to ER immediately for any worsening or change in current symptoms.             Patient Instructions   URINARY TRACT INFECTION:    -Take your antibiotics as directed and complete the entire course.    -Drink plenty fluids.  -Urine culture pending. We will call with results.  -If your symptoms are not improving or worsen, you will need to follow-up with  primary care or go to the emergency department    STOMACH UPSET:     Please make sure you are SLOWLY TAKING SMALL SIPS of fluids (Recommend: 1/2 Gatorade/Power-aid/Body Arslan/Pedialyte + 1/2 water) and getting plenty of rest.    Eat a bland diet of bananas, rice, applesauce, toast, crackers--advance your diet as you tolerate these foods.      YOU MAY TAKE OVER-THE-COUNTER PEPTO-BISMOL FOR STOMACH CRAMPING/DIARRHEA.  This may darken your stools.     Please do not take anything that would stop diarrhea-- For example Imodium.  You want to rid toxins and be sure to remain hydrated    Please follow-up with your primary care provider if your symptoms continue for longer than 5-7 days.    Go to the ER for any worsening or concerning symptoms.    If you have been discharged from the clinic prior to your point of care test results being completed, please make sure to check your [facility] account.  If there is a change in treatment, we will communicate with you through here.  If your test is positive, and medications are ordered, these will be sent to your preferred pharmacy.   If your test is negative, no further steps needed. If you do not hear from us or have questions, please call the clinic.      - You must understand that you have received an Urgent Care treatment only and that you may be released before all of your medical problems are known or treated.   - You, the patient, will arrange for follow up care as instructed with your primary care provider or recommended specialist.   - If your condition worsens or fails to improve we recommend that you receive another evaluation at the ER immediately or contact your PCP to discuss your concerns, or return here.   - Please do not drive or make any important decisions for 24 hours if you have received any pain medications, sedatives or mood altering drugs during your visit.    Disclaimer: This document was drafted with the use of a voice recognition device and is likely to have  sound alike errors.

## 2024-09-01 NOTE — PATIENT INSTRUCTIONS
URINARY TRACT INFECTION:    -Take your antibiotics as directed and complete the entire course.    -Drink plenty fluids.  -Urine culture pending. We will call with results.  -If your symptoms are not improving or worsen, you will need to follow-up with primary care or go to the emergency department    STOMACH UPSET:     Please make sure you are SLOWLY TAKING SMALL SIPS of fluids (Recommend: 1/2 Gatorade/Power-aid/Body Arslan/Pedialyte + 1/2 water) and getting plenty of rest.    Eat a bland diet of bananas, rice, applesauce, toast, crackers--advance your diet as you tolerate these foods.      YOU MAY TAKE OVER-THE-COUNTER PEPTO-BISMOL FOR STOMACH CRAMPING/DIARRHEA.  This may darken your stools.     Please do not take anything that would stop diarrhea-- For example Imodium.  You want to rid toxins and be sure to remain hydrated    Please follow-up with your primary care provider if your symptoms continue for longer than 5-7 days.    Go to the ER for any worsening or concerning symptoms.    If you have been discharged from the clinic prior to your point of care test results being completed, please make sure to check your MyChart account.  If there is a change in treatment, we will communicate with you through here.  If your test is positive, and medications are ordered, these will be sent to your preferred pharmacy.   If your test is negative, no further steps needed. If you do not hear from us or have questions, please call the clinic.      - You must understand that you have received an Urgent Care treatment only and that you may be released before all of your medical problems are known or treated.   - You, the patient, will arrange for follow up care as instructed with your primary care provider or recommended specialist.   - If your condition worsens or fails to improve we recommend that you receive another evaluation at the ER immediately or contact your PCP to discuss your concerns, or return here.   - Please do not  drive or make any important decisions for 24 hours if you have received any pain medications, sedatives or mood altering drugs during your visit.    Disclaimer: This document was drafted with the use of a voice recognition device and is likely to have sound alike errors.

## 2024-09-03 ENCOUNTER — HOSPITAL ENCOUNTER (OUTPATIENT)
Dept: PULMONOLOGY | Facility: HOSPITAL | Age: 19
Discharge: HOME | End: 2024-09-03
Payer: COMMERCIAL

## 2024-09-03 DIAGNOSIS — R40.4 STARING EPISODES: ICD-10-CM

## 2024-09-03 LAB — BACTERIA UR CULT: ABNORMAL

## 2024-09-03 PROCEDURE — 95816 EEG AWAKE AND DROWSY: CPT

## 2024-09-03 NOTE — PROCEDURES
DATE EEG PERFORMED:  2024.      DATE EEG INTERPRETED:  2024.                   DURATION OF EE MINUTES.         LEVEL OF CONSCIOUSENESS    Awake and Drowsy.        EEG BACKGROUND    The posterior dominant basic rhythm reaches 10-11 Hz, symmetric, reactive, well-modulated and well-sustained.         EEG CLASSIFICATION    Normal        IMPRESSION      The EEG is normal in the awake and drowsy states.       There are no epileptiform discharges or lateralizing signs. No typical events were recorded. There is no electrographic evidence of seizure.There is no electrographic evidence of status epilepticus.         PLEASE NOTE THAT A NON-EPILEPTIFORM EEG DOES NOT RULE OUT EPILEPSY.        EDUARDO LEGGETT MD, FAAN    Diplomate, American Board of Psychiatry and Neurology    Diplomate, American Board of Clinical Neurophysiology

## 2024-09-04 ENCOUNTER — TELEPHONE (OUTPATIENT)
Dept: URGENT CARE | Facility: CLINIC | Age: 19
End: 2024-09-04
Payer: COMMERCIAL

## 2024-09-04 NOTE — TELEPHONE ENCOUNTER
+ ecoli. Sensitive to macrobid that was rx. Attempt 1 to reach patient. ID confirmed with name and . Explained results. States symptoms improving. No longer running fever. Still having slight flank pain but overall improving. I explained to continue tx. Call clinic if still symptomatic after completion - may need dose extended or abx adjusted.

## 2024-09-20 ENCOUNTER — TELEPHONE (OUTPATIENT)
Dept: NEUROLOGY | Facility: CLINIC | Age: 19
End: 2024-09-20
Payer: COMMERCIAL

## 2024-09-28 DIAGNOSIS — F41.9 ANXIETY: ICD-10-CM

## 2024-09-28 DIAGNOSIS — F33.1 MODERATE EPISODE OF RECURRENT MAJOR DEPRESSIVE DISORDER: ICD-10-CM

## 2024-09-30 ENCOUNTER — PATIENT MESSAGE (OUTPATIENT)
Dept: PSYCHIATRY | Facility: CLINIC | Age: 19
End: 2024-09-30

## 2024-09-30 RX ORDER — TRAZODONE HYDROCHLORIDE 50 MG/1
50 TABLET ORAL NIGHTLY
Qty: 30 TABLET | Refills: 2 | Status: SHIPPED | OUTPATIENT
Start: 2024-09-30

## 2024-09-30 RX ORDER — ESCITALOPRAM OXALATE 10 MG/1
TABLET ORAL
Qty: 45 TABLET | Refills: 2 | Status: SHIPPED | OUTPATIENT
Start: 2024-09-30 | End: 2024-10-01 | Stop reason: SDUPTHER

## 2024-10-01 ENCOUNTER — OFFICE VISIT (OUTPATIENT)
Dept: PSYCHIATRY | Facility: CLINIC | Age: 19
End: 2024-10-01
Payer: OTHER GOVERNMENT

## 2024-10-01 DIAGNOSIS — F90.2 ATTENTION DEFICIT HYPERACTIVITY DISORDER (ADHD), COMBINED TYPE: ICD-10-CM

## 2024-10-01 DIAGNOSIS — F33.1 MODERATE EPISODE OF RECURRENT MAJOR DEPRESSIVE DISORDER: Primary | ICD-10-CM

## 2024-10-01 DIAGNOSIS — F51.5 NIGHTMARES: ICD-10-CM

## 2024-10-01 DIAGNOSIS — F41.9 ANXIETY: ICD-10-CM

## 2024-10-01 PROCEDURE — 90834 PSYTX W PT 45 MINUTES: CPT | Mod: 95,,, | Performed by: PSYCHOLOGIST

## 2024-10-01 PROCEDURE — 90863 PHARMACOLOGIC MGMT W/PSYTX: CPT | Mod: 95,,, | Performed by: PSYCHOLOGIST

## 2024-10-01 RX ORDER — LISDEXAMFETAMINE DIMESYLATE 60 MG/1
60 CAPSULE ORAL EVERY MORNING
Qty: 30 CAPSULE | Refills: 0 | Status: SHIPPED | OUTPATIENT
Start: 2024-10-01 | End: 2024-11-01

## 2024-10-01 RX ORDER — LISDEXAMFETAMINE DIMESYLATE 60 MG/1
60 CAPSULE ORAL EVERY MORNING
Qty: 30 CAPSULE | Refills: 0 | Status: SHIPPED | OUTPATIENT
Start: 2024-11-30 | End: 2024-12-30

## 2024-10-01 RX ORDER — LISDEXAMFETAMINE DIMESYLATE 60 MG/1
60 CAPSULE ORAL EVERY MORNING
Qty: 30 CAPSULE | Refills: 0 | Status: SHIPPED | OUTPATIENT
Start: 2024-10-31 | End: 2024-11-30

## 2024-10-01 RX ORDER — PRAZOSIN HYDROCHLORIDE 1 MG/1
1 CAPSULE ORAL NIGHTLY
Qty: 30 CAPSULE | Refills: 2 | Status: SHIPPED | OUTPATIENT
Start: 2024-10-01 | End: 2024-12-30

## 2024-10-01 RX ORDER — ESCITALOPRAM OXALATE 10 MG/1
10 TABLET ORAL DAILY
Qty: 30 TABLET | Refills: 2 | Status: SHIPPED | OUTPATIENT
Start: 2024-10-01 | End: 2024-12-30

## 2024-10-01 NOTE — PATIENT INSTRUCTIONS

## 2024-10-01 NOTE — PROGRESS NOTES
"  Outpatient Psychiatry Follow-Up Visit    10/1/2024      Virtual Visit    The patient location is: Patient's car/ Patient reported that his/her location at the time of this visit was in the [facility]     Visit type: Virtual visit with synchronous audio and video     Each patient to whom he or she provides medical services by telehealth is: (1) informed of the relationship between the medical psychologist and patient and the respective role of any other health care provider with respect to management of the patient; and (2) notified that he or she may decline to receive medical services by telehealth and may withdraw from such care at any time.    I also informed patient of the following:   Charis Childress, PhD, MPAP:  [place] medical license number: [id number]    My contact info:  [facility] / 2nd Floor  [address] [address]  [place] [address]   Ph: [phone]    If technology issues, call office phone: Ph: [phone] or [phone]  If crisis: Dial 911 or go to nearest Emergency Room (ER)  If questions related to privacy practices: contact [facility] Information Department: [phone]    Chief Complaint:  Freddie Fenton is a 18 y.o. female who presents today for follow-up of depression, anxiety, and inattention/distractibility .       Preferred Name: Freddie  Gender Identity: cis female  Preferred Pronouns: she/her    LAST VISIT: Freddie (Deonte--eber) attended her virtual visit. She said that she thought she was pregnant--having periods of sadness; friends and boyfriend started to notice. This has been happening "shortly after our last appt." She has also been gaining weight "but I think that's just from my eating habits." She said that she will either not eat or eat too much. She said that she did a pregnancy test and it was negative. She does plan to continue her therapy when she goes back to school. She said that she started taking Vyvanse again when she started " "eating a lot--has stopped again and her eating habits have reportedly leveled out. 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    She reported that she dozes off--she described it as spacing out    Mood started when she tried to fall asleep; starts thinking of situations. She said that she wakes up and her eyes are already open. It started happening during the day--"my eyes start going to the back of my head and I just sit there." It reportedly happens every day--sometimes does not catch it at night. Tends to happen at night when she is trying to fall asleep. When asked about what others see when it happens, she said that it happened yesterday for about 2 minutes. No one said anything or noticed. She said that it does not make her eyes dry; they "don't twitch or anything." She said that the first time it happened, she could feel it--her boyfriend told her she looked lost in thought and "that I looked possessed while lost in thought." She does not regularly have headaches. She denied that it happens when she drives--"I think it's because I'm focused on what I'm doing." She reported that she knows where she is; not ever in a public place; she can hear what's happening around her.    When asked about suicidal thoughts, she said, "I still have my nightmares or whatever"--sometimes has dreams that she dies or has intrusive thoughts of hurting herself. She gave an example of when in kitchen wondering about seeing a knife and stabbing herself. She denied any intent or plan or wish to die.    She stopped vaping recently--was vaping nicotine and one vape would reportedly last two months. She said that she would sometimes vape THC and has taken edibles and smoking.    At the end of the visit, she asked about emotional support animals on campus.    Plan--continue Vyvanse 60 mg (sent 3 scripts); trazodone 50 mg hs prn; increase Lexapro 15 mg; neurology referral; log episodes     CURRENT PRESENTATION: Freddie (Deonte--a) " "attended her virtual visit. She said that a lot has been happening with school. She is retaking a couple of her classes that she failed before and still not doing well. She is failing math because she has not been turning in work (did not have a math book). She believes that she is too far back to catch up. She is afraid of disappointing her parents again. She won't be able to pull up her GPA to get TOPS because this semester was supposed to get her GPA back up. She started crying again--not wanting to let her parents down. Her brother reportedly did not finish college. She feels like her mom would no longer love her--she has been having nightmares and is not sleeping as well. She has not been in counseling. She said that she went one time on campus and after completing the intake form was sent to the hospital and did not go back. She stopped taking her medicine "as soon as my grades started going down in math." She said that she initially forgot to take it and "then I started not even worrying about it." She said that she is anxious about telling her mom--"Everyone has been so proud that I finally have my stuff together, but I don't have it." We spent some time discussing worst case situation and trying to put it into perspective in the long term.  She has not been honest with her parents and has deliberately lied to her mom about how she is doing in school.  At this point, she was encouraged to do the best she can in the classes she is taking and accept whatever consequences are rendered.  She may not be able to return to [employer] for the spring, but there are other options available to her.  Depending on her financial aid status, she may qualify for a student loan.  She could attend [employer] in [place] to help get her GPA in transfer later back to [employer].  If her parents do not contribute financially, she could work and safe to go back to school and are use the student loans if she qualifies.  " Ultimately, however, if she goes back to school it is her responsibility to do the work required to continue to stay there.  When initially asked about the math book and asking her mom for the money (reportedly $35), she said that her mom had already given her money that she spent on my dorm   and she did not want to for more money.  We discussed a trial of prazosin to help with her nightmares.  See plan below.    Plan--restart Lexapro 10 mg (take 5 mg for first 6 days); Vyvanse 60 mg (sent 3 scripts); trazodone 50 mg hs prn; trial of prazosin 1 mg hs; Monitor BP     Mom (Tito)   Dad (Yobani)  Younger brother (age 9)     since June 2024.          Prior medicines: Zoloft, Lamictal  ---------------------------------------------------------------------------------------------------    PSYCHOTHERAPY      Site: [facility]  Time: 38 minutes  Participants: Met with patient    Therapeutic Intervention Type: behavior modifying psychotherapy, supportive psychotherapy  Why chosen therapy is appropriate versus another modality: patient responds to this modality, evidence based practice    Target symptoms: depression, lack of focus, anxiety , school, relationships  Primary focus: see above    Outcome monitoring methods: self-report, observation    Patient's response to intervention:  The patient's response to intervention is accepting.    Progress toward goals:  The patient's progress toward goals is fair .          9/30/2024    12:28 PM 9/25/2024     1:49 PM 6/26/2024    12:46 PM   GAD7   1. Feeling nervous, anxious, or on edge? 3  3  3    2. Not being able to stop or control worrying? 3  3  3    3. Worrying too much about different things? 3  3  3    4. Trouble relaxing? 3  3  3    5. Being so restless that it is hard to sit still? 3  3  3    6. Becoming easily annoyed or irritable? 3  3  3    7. Feeling afraid as if something awful might happen? 3  3  3    8. If you checked off any problems, how  difficult have these problems made it for you to do your work, take care of things at home, or get along with other people? 3  3  3    JOE-7 Score 21  21 21       Patient-reported      0-4 = Minimal anxiety  5-9 = Mild anxiety  10-14 = Moderate anxiety  15-21 = Severe anxiety         10/26/2021     4:00 PM   Depression Patient Health Questionnaire   Over the last two weeks how often have you been bothered by little interest or pleasure in doing things Not at all   Over the last two weeks how often have you been bothered by feeling down, depressed or hopeless Several days   PHQ-2 Total Score 1   Over the last two weeks how often have you been bothered by trouble falling or staying asleep, or sleeping too much Several days   Over the last two weeks how often have you been bothered by feeling tired or having little energy Not at all   Over the last two weeks how often have you been bothered by a poor appetite or overeating Nearly every day   Over the last two weeks how often have you been bothered by feeling bad about yourself - or that you are a failure or have let yourself or your family down More than half the days   Over the last two weeks how often have you been bothered by trouble concentrating on things, such as reading the newspaper or watching television Not at all   Over the last two weeks how often have you been bothered by moving or speaking so slowly that other people could have noticed. Or the opposite - being so fidgety or restless that you have been moving around a lot more than usual. More than half the days   Over the last two weeks how often have you been bothered by thoughts that you would be better off dead, or of hurting yourself Several days   If you checked off any problems, how difficult have these problems made it for you to do your work, take care of things at home or get along with other people? Not difficult at all   PHQ-9 Score 10   PHQ-9 Interpretation Moderate     0-4 = No  intervention  5 to 9 = Mild  10 to 14 = Moderate  15 to 19 = Moderately severe  =20 = Severe    Review of Systems   PSYCHIATRIC: Pertinant items are noted in the narrative.    Past Medical, Family and Social History: The patient's past medical, family and social history have been reviewed and updated as appropriate within the electronic medical record - see encounter notes.      Current Outpatient Medications:     EScitalopram oxalate (LEXAPRO) 10 MG tablet, Take 1 tablet (10 mg total) by mouth once daily., Disp: 30 tablet, Rfl: 2    ibuprofen (ADVIL,MOTRIN) 800 MG tablet, Take 800 mg by mouth every 8 (eight) hours as needed., Disp: , Rfl:     lisdexamfetamine (VYVANSE) 60 MG capsule, Take 1 capsule (60 mg total) by mouth every morning., Disp: 30 capsule, Rfl: 0    [START ON 10/31/2024] lisdexamfetamine (VYVANSE) 60 MG capsule, Take 1 capsule (60 mg total) by mouth every morning., Disp: 30 capsule, Rfl: 0    [START ON 11/30/2024] lisdexamfetamine (VYVANSE) 60 MG capsule, Take 1 capsule (60 mg total) by mouth every morning., Disp: 30 capsule, Rfl: 0    norgestimate-ethinyl estradioL (ORTHO TRI-CYCLEN LO) 0.18/0.215/0.25 mg-25 mcg tablet, Take 1 tablet by mouth once daily., Disp: 30 tablet, Rfl: 11    prazosin (MINIPRESS) 1 MG Cap, Take 1 capsule (1 mg total) by mouth every evening., Disp: 30 capsule, Rfl: 2    traZODone (DESYREL) 50 MG tablet, TAKE 1 TABLET(50 MG) BY MOUTH EVERY EVENING, Disp: 30 tablet, Rfl: 2    Compliance: no    Side effects: see above    Risk Parameters:  Patient reports no suicidal ideation  Patient reports no homicidal ideation  Patient reports no self-injurious behavior  Patient reports no violent behavior    Exam (detailed: at least 9 elements; comprehensive: all 15 elements)   Constitutional  Vitals:  Most recent vital signs were reviewed.   Last 3 sets of Vitals        12/21/2023    10:16 AM 8/23/2024     4:18 PM 9/1/2024    11:57 AM   Vitals - 1 value per visit   SYSTOLIC 110 128 120  "  DIASTOLIC 80 86 73   Pulse 79 112 110   Temp 97.4 °F (36.3 °C)  99 °F (37.2 °C)   Resp   20   SPO2 97 %  98 %   Weight (lb) 177.25 191.58 185   Weight (kg) 80.4 86.9 83.915   Height 5' 7" (1.702 m) 5' 7" (1.702 m) 5' 7" (1.702 m)   BMI (Calculated) 27.8 30 29   Pain Score Zero Zero Five          General:  age appropriate, casually dressed, neatly groomed, silk cap over head     Musculoskeletal  Muscle Strength/Tone:  no tremor, no tic   Gait & Station:  video visit     Psychiatric  Speech:  no latency; no press   Behavior: wnl   Mood & Affect:  anxious, depressed  congruent and appropriate, crying throughout much of visit   Thought Process:  normal and logical   Associations:  intact   Thought Content:  normal, no suicidality, no homicidality, delusions, or paranoia   Insight:  has awareness of illness   Judgement: behavior is adequate to circumstances   Orientation:  grossly intact   Memory: intact for content of interview   Language: grossly intact   Attention Span & Concentration:  Grossly intact   Fund of Knowledge:  intact and appropriate to age and level of education     Assessment and Diagnosis   Status/Progress: Based on the examination today, the patient's problem(s) is/are inadequately controlled.  New problems have been presented today.   Co-morbidities, Lack of compliance, and psychosocial stressors  are complicating management of the primary condition.  There are no active rule-out diagnoses for this patient at this time.     General Impression:     Encounter Diagnoses   Name Primary?    Moderate episode of recurrent major depressive disorder Yes    Attention deficit hyperactivity disorder (ADHD), combined type     Anxiety     Nightmares        Intervention/Counseling/Treatment Plan   Medication Management: Discussed risks, benefits, and alternatives to treatment plan documented above with patient. I answered all patient questions related to this plan, and patient expressed understanding and " agreement.   restart Lexapro 10 mg (take 5 mg for first 6 days); Vyvanse 60 mg (sent 3 scripts); trazodone 50 mg hs prn; trial of prazosin 1 mg hs  Counseling strongly encouraged  Monitor BP      Medication List with Changes/Refills   New Medications    LISDEXAMFETAMINE (VYVANSE) 60 MG CAPSULE    Take 1 capsule (60 mg total) by mouth every morning.    LISDEXAMFETAMINE (VYVANSE) 60 MG CAPSULE    Take 1 capsule (60 mg total) by mouth every morning.    LISDEXAMFETAMINE (VYVANSE) 60 MG CAPSULE    Take 1 capsule (60 mg total) by mouth every morning.    PRAZOSIN (MINIPRESS) 1 MG CAP    Take 1 capsule (1 mg total) by mouth every evening.   Current Medications    IBUPROFEN (ADVIL,MOTRIN) 800 MG TABLET    Take 800 mg by mouth every 8 (eight) hours as needed.    NORGESTIMATE-ETHINYL ESTRADIOL (ORTHO TRI-CYCLEN LO) 0.18/0.215/0.25 MG-25 MCG TABLET    Take 1 tablet by mouth once daily.    TRAZODONE (DESYREL) 50 MG TABLET    TAKE 1 TABLET(50 MG) BY MOUTH EVERY EVENING   Changed and/or Refilled Medications    Modified Medication Previous Medication    ESCITALOPRAM OXALATE (LEXAPRO) 10 MG TABLET EScitalopram oxalate (LEXAPRO) 10 MG tablet       Take 1 tablet (10 mg total) by mouth once daily.    TAKE 1 AND 1/2 TABLETS(15 MG) BY MOUTH DAILY   Discontinued Medications    LISDEXAMFETAMINE (VYVANSE) 60 MG CAPSULE    Take 1 capsule (60 mg total) by mouth every morning.        Return to Clinic: 6 weeks, in person    I spent an additional 8 minutes performing E/M services with >50% spent on counseling, guidance, coordinating care (not Psychotherapy related) in addition to the 38 minutes performing Psychotherapy.    Time spent with pt including note preparation: 46 minutes       Charis Childress, PhD, MP  Advanced Practice Medical Psychologist  [facility]  [place] [address]  [phone]   [phone] fax

## 2024-11-25 DIAGNOSIS — Z30.41 ENCOUNTER FOR SURVEILLANCE OF CONTRACEPTIVE PILLS: ICD-10-CM

## 2024-11-25 RX ORDER — NORGESTIMATE AND ETHINYL ESTRADIOL 7DAYSX3 LO
1 KIT ORAL DAILY
Qty: 30 TABLET | Refills: 3 | Status: SHIPPED | OUTPATIENT
Start: 2024-11-25

## 2024-11-25 NOTE — TELEPHONE ENCOUNTER
No care due was identified.  [facility] Embedded Care Due Messages. Reference number: [id number].   11/25/2024 2:32:28 PM CST

## 2024-11-26 ENCOUNTER — PATIENT OUTREACH (OUTPATIENT)
Dept: ADMINISTRATIVE | Facility: HOSPITAL | Age: 19
End: 2024-11-26
Payer: COMMERCIAL

## 2024-11-26 NOTE — PROGRESS NOTES
VBHM Score: 0     Patient is not due for any topics at this time.                 Follow up set.

## 2024-12-26 ENCOUNTER — OFFICE VISIT (OUTPATIENT)
Dept: INTERNAL MEDICINE | Facility: CLINIC | Age: 19
End: 2024-12-26
Payer: COMMERCIAL

## 2024-12-26 VITALS
HEIGHT: 67 IN | OXYGEN SATURATION: 97 % | SYSTOLIC BLOOD PRESSURE: 118 MMHG | BODY MASS INDEX: 27.64 KG/M2 | TEMPERATURE: 99 F | DIASTOLIC BLOOD PRESSURE: 74 MMHG | HEART RATE: 89 BPM | WEIGHT: 176.13 LBS

## 2024-12-26 DIAGNOSIS — G47.00 INSOMNIA, UNSPECIFIED TYPE: ICD-10-CM

## 2024-12-26 DIAGNOSIS — F33.1 MODERATE EPISODE OF RECURRENT MAJOR DEPRESSIVE DISORDER: ICD-10-CM

## 2024-12-26 DIAGNOSIS — Z23 NEED FOR VACCINATION: ICD-10-CM

## 2024-12-26 DIAGNOSIS — Z00.00 ROUTINE GENERAL MEDICAL EXAMINATION AT A HEALTH CARE FACILITY: Primary | ICD-10-CM

## 2024-12-26 PROCEDURE — 99999 PR PBB SHADOW E&M-EST. PATIENT-LVL IV: CPT | Mod: PBBFAC,,, | Performed by: FAMILY MEDICINE

## 2024-12-26 PROCEDURE — 3078F DIAST BP <80 MM HG: CPT | Mod: CPTII,S$GLB,, | Performed by: FAMILY MEDICINE

## 2024-12-26 PROCEDURE — 1160F RVW MEDS BY RX/DR IN RCRD: CPT | Mod: CPTII,S$GLB,, | Performed by: FAMILY MEDICINE

## 2024-12-26 PROCEDURE — 3074F SYST BP LT 130 MM HG: CPT | Mod: CPTII,S$GLB,, | Performed by: FAMILY MEDICINE

## 2024-12-26 PROCEDURE — 99395 PREV VISIT EST AGE 18-39: CPT | Mod: 25,S$GLB,, | Performed by: FAMILY MEDICINE

## 2024-12-26 PROCEDURE — 3008F BODY MASS INDEX DOCD: CPT | Mod: CPTII,S$GLB,, | Performed by: FAMILY MEDICINE

## 2024-12-26 PROCEDURE — 3044F HG A1C LEVEL LT 7.0%: CPT | Mod: CPTII,S$GLB,, | Performed by: FAMILY MEDICINE

## 2024-12-26 PROCEDURE — 1159F MED LIST DOCD IN RCRD: CPT | Mod: CPTII,S$GLB,, | Performed by: FAMILY MEDICINE

## 2024-12-26 PROCEDURE — G0008 ADMIN INFLUENZA VIRUS VAC: HCPCS | Mod: S$GLB,,, | Performed by: FAMILY MEDICINE

## 2024-12-26 PROCEDURE — 90656 IIV3 VACC NO PRSV 0.5 ML IM: CPT | Mod: S$GLB,,, | Performed by: FAMILY MEDICINE

## 2024-12-26 RX ORDER — ACETAMINOPHEN AND CODEINE PHOSPHATE 300; 30 MG/1; MG/1
1 TABLET ORAL EVERY 4 HOURS PRN
COMMUNITY
Start: 2024-11-16

## 2024-12-27 RX ORDER — TRAZODONE HYDROCHLORIDE 50 MG/1
50 TABLET ORAL NIGHTLY
Qty: 30 TABLET | Refills: 2 | Status: SHIPPED | OUTPATIENT
Start: 2024-12-27

## 2024-12-30 ENCOUNTER — LAB VISIT (OUTPATIENT)
Dept: LAB | Facility: HOSPITAL | Age: 19
End: 2024-12-30
Attending: FAMILY MEDICINE
Payer: COMMERCIAL

## 2024-12-30 DIAGNOSIS — Z00.00 ROUTINE GENERAL MEDICAL EXAMINATION AT A HEALTH CARE FACILITY: ICD-10-CM

## 2024-12-30 LAB
ALBUMIN SERPL BCP-MCNC: 4 G/DL (ref 3.5–5.2)
ALP SERPL-CCNC: 59 U/L (ref 40–150)
ALT SERPL W/O P-5'-P-CCNC: 15 U/L (ref 10–44)
ANION GAP SERPL CALC-SCNC: 10 MMOL/L (ref 8–16)
AST SERPL-CCNC: 16 U/L (ref 10–40)
BASOPHILS # BLD AUTO: 0.04 K/UL (ref 0–0.2)
BASOPHILS NFR BLD: 0.8 % (ref 0–1.9)
BILIRUB SERPL-MCNC: 0.4 MG/DL (ref 0.1–1)
BUN SERPL-MCNC: 8 MG/DL (ref 6–20)
CALCIUM SERPL-MCNC: 9.4 MG/DL (ref 8.7–10.5)
CHLORIDE SERPL-SCNC: 110 MMOL/L (ref 95–110)
CHOLEST SERPL-MCNC: 141 MG/DL (ref 120–199)
CHOLEST/HDLC SERPL: 4 {RATIO} (ref 2–5)
CO2 SERPL-SCNC: 21 MMOL/L (ref 23–29)
CREAT SERPL-MCNC: 0.7 MG/DL (ref 0.5–1.4)
DIFFERENTIAL METHOD BLD: ABNORMAL
EOSINOPHIL # BLD AUTO: 0.1 K/UL (ref 0–0.5)
EOSINOPHIL NFR BLD: 1.8 % (ref 0–8)
ERYTHROCYTE [DISTWIDTH] IN BLOOD BY AUTOMATED COUNT: 13.2 % (ref 11.5–14.5)
EST. GFR  (NO RACE VARIABLE): >60 ML/MIN/1.73 M^2
ESTIMATED AVG GLUCOSE: 88 MG/DL (ref 68–131)
GLUCOSE SERPL-MCNC: 71 MG/DL (ref 70–110)
HBA1C MFR BLD: 4.7 % (ref 4–5.6)
HCT VFR BLD AUTO: 40.1 % (ref 37–48.5)
HCV AB SERPL QL IA: NORMAL
HDLC SERPL-MCNC: 35 MG/DL (ref 40–75)
HDLC SERPL: 24.8 % (ref 20–50)
HGB BLD-MCNC: 13.5 G/DL (ref 12–16)
HIV 1+2 AB+HIV1 P24 AG SERPL QL IA: NORMAL
IMM GRANULOCYTES # BLD AUTO: 0.01 K/UL (ref 0–0.04)
IMM GRANULOCYTES NFR BLD AUTO: 0.2 % (ref 0–0.5)
LDLC SERPL CALC-MCNC: 98.6 MG/DL (ref 63–159)
LYMPHOCYTES # BLD AUTO: 2.4 K/UL (ref 1–4.8)
LYMPHOCYTES NFR BLD: 49.5 % (ref 18–48)
MCH RBC QN AUTO: 28.7 PG (ref 27–31)
MCHC RBC AUTO-ENTMCNC: 33.7 G/DL (ref 32–36)
MCV RBC AUTO: 85 FL (ref 82–98)
MONOCYTES # BLD AUTO: 0.4 K/UL (ref 0.3–1)
MONOCYTES NFR BLD: 8.4 % (ref 4–15)
NEUTROPHILS # BLD AUTO: 1.9 K/UL (ref 1.8–7.7)
NEUTROPHILS NFR BLD: 39.3 % (ref 38–73)
NONHDLC SERPL-MCNC: 106 MG/DL
NRBC BLD-RTO: 0 /100 WBC
PLATELET # BLD AUTO: 330 K/UL (ref 150–450)
PMV BLD AUTO: 11.8 FL (ref 9.2–12.9)
POTASSIUM SERPL-SCNC: 4.1 MMOL/L (ref 3.5–5.1)
PROT SERPL-MCNC: 7.6 G/DL (ref 6–8.4)
RBC # BLD AUTO: 4.71 M/UL (ref 4–5.4)
SODIUM SERPL-SCNC: 141 MMOL/L (ref 136–145)
T4 FREE SERPL-MCNC: 0.75 NG/DL (ref 0.71–1.51)
TRIGL SERPL-MCNC: 37 MG/DL (ref 30–150)
TSH SERPL DL<=0.005 MIU/L-ACNC: 0.62 UIU/ML (ref 0.4–4)
WBC # BLD AUTO: 4.91 K/UL (ref 3.9–12.7)

## 2024-12-30 PROCEDURE — 80061 LIPID PANEL: CPT | Performed by: FAMILY MEDICINE

## 2024-12-30 PROCEDURE — 85025 COMPLETE CBC W/AUTO DIFF WBC: CPT | Performed by: FAMILY MEDICINE

## 2024-12-30 PROCEDURE — 86803 HEPATITIS C AB TEST: CPT | Performed by: FAMILY MEDICINE

## 2024-12-30 PROCEDURE — 84439 ASSAY OF FREE THYROXINE: CPT | Performed by: FAMILY MEDICINE

## 2024-12-30 PROCEDURE — 84443 ASSAY THYROID STIM HORMONE: CPT | Performed by: FAMILY MEDICINE

## 2024-12-30 PROCEDURE — 83036 HEMOGLOBIN GLYCOSYLATED A1C: CPT | Performed by: FAMILY MEDICINE

## 2024-12-30 PROCEDURE — 80053 COMPREHEN METABOLIC PANEL: CPT | Performed by: FAMILY MEDICINE

## 2024-12-30 PROCEDURE — 87389 HIV-1 AG W/HIV-1&-2 AB AG IA: CPT | Performed by: FAMILY MEDICINE

## 2024-12-30 PROCEDURE — 36415 COLL VENOUS BLD VENIPUNCTURE: CPT | Mod: PO | Performed by: FAMILY MEDICINE

## 2025-01-01 NOTE — PROGRESS NOTES
Subjective     Patient ID: Freddie Fenton is a 19 y.o. female.    Chief Complaint: Annual Exam    History of Present Illness    CHIEF COMPLAINT:  Freddie presents for annual exam    HPI:  Freddie reports depression symptoms, including sleep disturbances and significant stress, with some days being more challenging than others. She has intermittent sleep patterns and finds intervening periods stressful. She currently takes hydroxyzine and trazodone for sleep, which she finds beneficial.    Freddie needs a new prescription for glasses, as she discontinued wearing them since high school but requires them for vision. She occasionally uses her old glasses.    She discloses a recent incident of self-harm, describing scratches on her arm due to a stressful day when she lost her tops scholarship for college. She states this was an isolated occurrence and that her legs likely have similar lloyd. Freddie was previously seeing a counselor on campus at [facility] but has not had recent appointments.    Freddie is sexually active and uses birth control, with 3 packs recently refilled through the patient portal.    She denies chest pain, shortness of breath, and problems with constipation.    MEDICATIONS:  Freddie is on Hydroxyzine and Trazodone for sleep. She is also taking Lexapro. Her birth control prescription was refilled on , with three packs remaining.    MEDICAL HISTORY:  Freddie has a history of depression. Freddie has not had a Pap smear yet, but one is scheduled for next year. She is currently on birth control pills and is sexually active. Her obstetric history is .    SOCIAL HISTORY:  Freddie is currently smoking, having previously quit. She is a student majoring in psychology, and was previously studying nursing.      ROS:  General: -fever, -chills, -fatigue, -weight gain, -weight loss  Eyes: -vision changes, -redness, -discharge  ENT: -ear pain, -nasal congestion, -sore throat  Cardiovascular: -chest pain,  -palpitations, -lower extremity edema  Respiratory: -cough, -shortness of breath  Gastrointestinal: -abdominal pain, -nausea, -vomiting, -diarrhea, -constipation, -blood in stool  Genitourinary: -dysuria, -hematuria, -frequency  Musculoskeletal: -joint pain, -muscle pain  Skin: -rash, -lesion  Neurological: -headache, -dizziness, -numbness, -tingling  Psychiatric: -anxiety, -depression, +sleep difficulty            Objective     Physical Exam  Vitals and nursing note reviewed.   Constitutional:       Appearance: Normal appearance. She is normal weight.   HENT:      Head: Normocephalic and atraumatic.      Right Ear: Tympanic membrane, ear canal and external ear normal.      Left Ear: Tympanic membrane, ear canal and external ear normal.      Nose: Nose normal.      Mouth/Throat:      Mouth: Mucous membranes are moist.      Pharynx: Oropharynx is clear.   Eyes:      Extraocular Movements: Extraocular movements intact.      Conjunctiva/sclera: Conjunctivae normal.   Cardiovascular:      Rate and Rhythm: Normal rate and regular rhythm.      Pulses: Normal pulses.      Heart sounds: Normal heart sounds.   Pulmonary:      Effort: Pulmonary effort is normal.      Breath sounds: Normal breath sounds.   Abdominal:      General: Abdomen is flat. Bowel sounds are normal.      Palpations: Abdomen is soft.   Musculoskeletal:         General: Normal range of motion.      Cervical back: Normal range of motion and neck supple.      Right lower leg: No edema.      Left lower leg: Edema present.   Skin:     General: Skin is warm and dry.   Neurological:      General: No focal deficit present.      Mental Status: She is alert and oriented to person, place, and time.   Psychiatric:         Mood and Affect: Mood normal.         Behavior: Behavior normal.                Assessment and Plan     1. Routine general medical examination at a health care facility  Comments:  reviewed age appropriate screenings and immunizations  Orders:  -      T4, Free; Future; Expected date: 12/30/2024  -     TSH; Future; Expected date: 12/30/2024  -     Hemoglobin A1C; Future; Expected date: 12/30/2024  -     Lipid Panel; Future; Expected date: 12/30/2024  -     Comprehensive Metabolic Panel; Future; Expected date: 12/30/2024  -     CBC Auto Differential; Future; Expected date: 12/30/2024  -     HIV 1/2 Ag/Ab (4th Gen); Future; Expected date: 12/30/2024  -     HEPATITIS C ANTIBODY; Future; Expected date: 12/30/2024  -     Ambulatory referral/consult to Ophthalmology; Future; Expected date: 01/02/2025    2. Need for vaccination  -     Influenza - Trivalent - PF (ADULT)    3. Moderate episode of recurrent major depressive disorder    4. 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 due to anxiety around needles  Assessed coping mechanisms and support system, recognizing recent changes in living situation have impacted stress management    PATIENT EDUCATION:   Explained Pap smear procedure, its purpose for cervical cancer screening, and reassured patient about discomfort level   Discussed importance of finding alternative coping mechanisms instead of self-harm    ACTION ITEMS/LIFESTYLE:   Jaia to continue current stress management techniques (listening to music, playing games, cleaning room)   Jaia to engage in activities to maintain productivity and manage stress    MEDICATIONS:   Continued hydroxyzine for sleep; no changes   Continued trazodone for sleep; no changes   Continued birth control; patient has 3 packs remaining, good through  February    ORDERS:   Ordered fasting lab work (thyroid, diabetes screening, cholesterol, kidney, liver function, sodium, potassium, blood count)   Added HIV and hepatitis C screening to lab work   Administered flu vaccine in office    REFERRALS:   Referred to eye doctor for new prescription and eye exam    FOLLOW UP:   Follow up when patient turns 21 to discuss Pap smear screening   Freddie to complete fasting lab work on Monday before 2 PM   Freddie to request birth control refill through patient portal when needed              Follow up in about 1 year (around 12/26/2025) for Annual Exam.    This note was generated with the assistance of ambient listening technology. Verbal consent was obtained by the patient and accompanying visitor(s) for the recording of patient appointment to facilitate this note. I attest to having reviewed and edited the generated note for accuracy, though some syntax or spelling errors may persist. Please contact the author of this note for any clarification.

## 2025-01-28 ENCOUNTER — OFFICE VISIT (OUTPATIENT)
Dept: PSYCHIATRY | Facility: CLINIC | Age: 20
End: 2025-01-28
Payer: COMMERCIAL

## 2025-01-28 DIAGNOSIS — F90.2 ATTENTION DEFICIT HYPERACTIVITY DISORDER (ADHD), COMBINED TYPE: ICD-10-CM

## 2025-01-28 DIAGNOSIS — F41.9 ANXIETY: ICD-10-CM

## 2025-01-28 DIAGNOSIS — F51.5 NIGHTMARES: ICD-10-CM

## 2025-01-28 DIAGNOSIS — F33.1 MODERATE EPISODE OF RECURRENT MAJOR DEPRESSIVE DISORDER: Primary | ICD-10-CM

## 2025-01-28 PROCEDURE — 99999 PR PBB SHADOW E&M-EST. PATIENT-LVL II: CPT | Mod: PBBFAC,,, | Performed by: PSYCHOLOGIST

## 2025-01-28 PROCEDURE — 90863 PHARMACOLOGIC MGMT W/PSYTX: CPT | Mod: S$GLB,,, | Performed by: PSYCHOLOGIST

## 2025-01-28 PROCEDURE — 1159F MED LIST DOCD IN RCRD: CPT | Mod: CPTII,S$GLB,, | Performed by: PSYCHOLOGIST

## 2025-01-28 PROCEDURE — 90832 PSYTX W PT 30 MINUTES: CPT | Mod: S$GLB,,, | Performed by: PSYCHOLOGIST

## 2025-01-28 RX ORDER — ESCITALOPRAM OXALATE 10 MG/1
10 TABLET ORAL DAILY
Qty: 30 TABLET | Refills: 2 | Status: SHIPPED | OUTPATIENT
Start: 2025-01-28 | End: 2025-04-28

## 2025-01-28 RX ORDER — PRAZOSIN HYDROCHLORIDE 1 MG/1
1 CAPSULE ORAL NIGHTLY
Qty: 30 CAPSULE | Refills: 2 | Status: SHIPPED | OUTPATIENT
Start: 2025-01-28 | End: 2025-04-28

## 2025-01-28 NOTE — PROGRESS NOTES
"Outpatient Psychiatry Follow-Up Visit     1/28/2025      Clinical Status of Patient:  Outpatient (Ambulatory)    Preferred Name: Freddie  Gender Identity: cis female  Preferred Pronouns: she/her    LAST VISIT: Freddie (Deonte--eber) attended her virtual visit. She said that a lot has been happening with school. She is retaking a couple of her classes that she failed before and still not doing well. She is failing math because she has not been turning in work (did not have a math book). She believes that she is too far back to catch up. She is afraid of disappointing her parents again. She won't be able to pull up her GPA to get TOPS because this semester was supposed to get her GPA back up. She started crying again--not wanting to let her parents down. Her brother reportedly did not finish college. She feels like her mom would no longer love her--she has been having nightmares and is not sleeping as well. She has not been in counseling. She said that she went one time on campus and after completing the intake form was sent to the hospital and did not go back. She stopped taking her medicine "as soon as my grades started going down in math." She said that she initially forgot to take it and "then I started not even worrying about it." She said that she is anxious about telling her mom--"Everyone has been so proud that I finally have my stuff together, but I don't have it." We spent some time discussing worst case situation and trying to put it into perspective in the long term.  She has not been honest with her parents and has deliberately lied to her mom about how she is doing in school.  At this point, she was encouraged to do the best she can in the classes she is taking and accept whatever consequences are rendered.  She may not be able to return to [employer] for the spring, but there are other options available to her.  Depending on her financial aid status, she may qualify for a student loan.  She could attend [employer] in " [place] to help get her GPA in transfer later back to [facility] [employer].  If her parents do not contribute financially, she could work and safe to go back to school and are use the student loans if she qualifies.  Ultimately, however, if she goes back to school it is her responsibility to do the work required to continue to stay there.  When initially asked about the math book and asking her mom for the money (reportedly $35), she said that her mom had already given her money that she spent on my dorm   and she did not want to for more money.  We discussed a trial of prazosin to help with her nightmares.  See plan below.     Plan--restart Lexapro 10 mg (take 5 mg for first 6 days); Vyvanse 60 mg (sent 3 scripts); trazodone 50 mg hs prn; trial of prazosin 1 mg hs; Monitor BP     History of Present Illness    CHIEF COMPLAINT:  Freddie presents for a follow-up visit to discuss medication management and recent life changes, last seen in early October.    HPI:  Freddie reports mixed results with anxiety management, describing it as "kind of one and one." Nightmares have improved, with occasional occurrences that are less severe than before. She can now wake up from bad dreams and return to sleep.    Freddie is on academic suspension after passing classes with [employer] and plans to return to [employer] in August, commuting from her mother's house due to financial constraints.    She experiences occasional headaches and vomiting, particularly around her menstrual cycle. These symptoms often coincide with her period.    Freddie notes improved relations with her mother. She is currently single but talking to a friend from high school.    She is working at [employer] and plans to start a job at [employer]. She also does [employer] on the side to save money for school.    Freddie reports a recent incident of self-harm in January during a stressful period and argument with her mother.    She is unsure about her current " depression levels, noting that she has not been doing much besides working since returning home.    Freddie denies any current suicidal thoughts or intentions and taking any medications other than those prescribed by the practitioner.    MEDICATIONS:  Freddie has restarted Lexapro 10 mg daily for anxiety after being off it for a while. She is on Trazodone 50 mg as needed for sleep and Prazosin 1 mg nightly for nightmares. She has a stockpile of Vyvanse 60 mg for ADHD from previous prescriptions but is not currently taking it regularly. Freddie is also on birth control, which she takes regularly.    LIFESTYLE:  Freddie was on academic suspension from [employer] but passed her last semester with [employer] after previously failing some classes. She plans to return to [employer] in August, commuting from her mother's house. Currently, she is working full-time at [employer] in [place] and is planning to start a job as a  at [employer]. She is considering doing [employer] as a side job. Freddie is on birth control and not currently pregnant. She is living at her mother's house after previously residing in a university dorm. Her relationship with her mother has been improving. She has a supportive group of female friends. Freddie recently ended a relationship with her boyfriend and is currently in platonic communication with a friend from high school.      ROS:  Head: +headaches  Gastrointestinal: +vomiting  Psychiatric: +depression, +anxiety, -suicidal ideation       PSYCHIATRIC: Pertinant items are noted in the narrative.    Past Medical, Family and Social History: The patient's past medical, family and social history have been reviewed and updated as appropriate within the electronic medical record - see encounter notes.          1/25/2025     9:57 PM 9/30/2024    12:28 PM 9/25/2024     1:49 PM   GAD7   1. Feeling nervous, anxious, or on edge? 3  3  3    2. Not being able to stop or control worrying? 3  3  3    3.  Worrying too much about different things? 3  3  3    4. Trouble relaxing? 2  3  3    5. Being so restless that it is hard to sit still? 0  3  3    6. Becoming easily annoyed or irritable? 1  3  3    7. Feeling afraid as if something awful might happen? 3  3  3    8. If you checked off any problems, how difficult have these problems made it for you to do your work, take care of things at home, or get along with other people? 0  3  3    JOE-7 Score 15  21  21       Patient-reported      0-4 = Minimal anxiety  5-9 = Mild anxiety  10-14 = Moderate anxiety  15-21 = Severe anxiety         12/26/2024     3:08 PM 10/26/2021     4:00 PM   Depression Patient Health Questionnaire   Over the last two weeks how often have you been bothered by little interest or pleasure in doing things More than half the days Not at all   Over the last two weeks how often have you been bothered by feeling down, depressed or hopeless Several days Several days   PHQ-2 Total Score 3 1   Over the last two weeks how often have you been bothered by trouble falling or staying asleep, or sleeping too much  Several days   Over the last two weeks how often have you been bothered by feeling tired or having little energy  Not at all   Over the last two weeks how often have you been bothered by a poor appetite or overeating  Nearly every day   Over the last two weeks how often have you been bothered by feeling bad about yourself - or that you are a failure or have let yourself or your family down  More than half the days   Over the last two weeks how often have you been bothered by trouble concentrating on things, such as reading the newspaper or watching television  Not at all   Over the last two weeks how often have you been bothered by moving or speaking so slowly that other people could have noticed. Or the opposite - being so fidgety or restless that you have been moving around a lot more than usual.  More than half the days   Over the last two weeks  "how often have you been bothered by thoughts that you would be better off dead, or of hurting yourself  Several days   If you checked off any problems, how difficult have these problems made it for you to do your work, take care of things at home or get along with other people?  Not difficult at all   PHQ-9 Score  10   PHQ-9 Interpretation  Moderate     0-4 = No intervention  5 to 9 = Mild  10 to 14 = Moderate  15 to 19 = Moderately severe  =20 = Severe    Risk Parameters:  Patient reports no suicidal ideation  Patient reports no homicidal ideation  Patient reports self-injurious behavior: cut wrist with cuticle  early Jan--"I needed to hurt myself."  Patient reports no violent behavior    Exam (detailed: at least 9 elements; comprehensive: all 15 elements)   Constitutional  Vitals:  Most recent vital signs were reviewed.   Last 3 sets of Vitals        8/23/2024     4:18 PM 9/1/2024    11:57 AM 12/26/2024     3:07 PM   Vitals - 1 value per visit   SYSTOLIC 128 120 118   DIASTOLIC 86 73 74   Pulse 112 110 89   Temp  99 °F (37.2 °C) 98.8 °F (37.1 °C)   Resp  20    SPO2  98 % 97 %   Weight (lb) 191.58 185 176.15   Weight (kg) 86.9 83.915 79.9   Height 5' 7" (1.702 m) 5' 7" (1.702 m) 5' 7" (1.702 m)   BMI (Calculated) 30 29 27.6   Pain Score Zero Five Zero          General:  age appropriate, casually dressed, neatly groomed, long evelyn     Musculoskeletal  Muscle Strength/Tone:  no tremor, no tic   Gait & Station:  non-ataxic     Psychiatric  Speech:  no latency; no press   Behavior: Wringing hands; shaking leg   Mood & Affect:  anxious, depressed  congruent and appropriate   Thought Process:  normal and logical   Associations:  intact   Thought Content:  See above   Insight:  has awareness of illness   Judgement: behavior is adequate to circumstances   Orientation:  grossly intact   Memory: intact for content of interview   Language: grossly intact   Attention Span & Concentration:  Grossly intact   Fund of " Knowledge:  intact and appropriate to age and level of education     General Impression:     Encounter Diagnoses   Name Primary?    Moderate episode of recurrent major depressive disorder Yes    Anxiety     Nightmares     Attention deficit hyperactivity disorder (ADHD), combined type        Assessment & Plan    - Continued current medication regimen, including Lexapro 10mg, Trazodone 50mg PRN for sleep, Prazosin 1mg for nightmares, and Vyvanse 60mg  - Assessed depression as still in moderate range  - Evaluated anxiety, nightmares, and ADHD symptoms  - Considered patient's recent academic challenges and current work situation  - Addressed recent self-harm incident  - Discussed potential connection between menstrual cycle and reported periodic sickness    MAJOR DEPRESSIVE DISORDER:  - Continued Lexapro 10mg daily.  - Discussed healthy coping mechanisms for managing strong emotions.  - Recommend implementing physical activities (e.g., jumping, fast walking) as a coping mechanism for strong emotions.  - Jaia to track internal dialogue, stress levels, and sleep patterns in relation to sickness episodes.  - Referred to therapist within the clinic system.  - Obtain list of community mental health providers from [employer] and send for review.    ATTENTION-DEFICIT HYPERACTIVITY DISORDER:  - Continued Vyvanse 60mg daily (patient to use existing supply).  - Jaia can consider using audiobooks or music during commute to school.    REM SLEEP BEHAVIOR DISORDER:  - Continued Trazodone 50mg PRN for sleep.  - Continued Prazosin 1mg for nightmares.    PREMENSTRUAL TENSION SYNDROME:  - Contact gynecologist regarding prophylactic treatment for menstrual-related symptoms.    OTHER SPECIFIED PROBLEMS RELATED TO PRIMARY SUPPORT GROUP:  - Educated on potential causes of periodic sickness, including medication side effects, stress/anxiety, and possible food allergies.  - Emphasized the importance of tracking food intake, sleep  patterns, and emotional state to identify potential triggers for sickness.  - Alvinia to keep a food diary to track potential food allergies or triggers for periodic sickness.    FOLLOW-UP:  - Follow up in 2-3 months.         I spent an additional 13 minutes performing E/M services with >50% spent on counseling, guidance, coordinating care (not Psychotherapy related) in addition to the 16 minutes performing Psychotherapy.    I spent a total of 29 minutes on the day of the visit. This includes face to face time and non-face to face time preparing to see the patient (eg, review of tests), obtaining and/or reviewing separately obtained history, documenting clinical information in the electronic or other health record, independently interpreting results and communicating results to the patient/family/caregiver, or care coordinator.       Charis Childress, PhD, MP  Advanced Practice Medical Psychologist  [facility] [address] [place] [address]  [phone]   [phone] fax    This note was generated with the assistance of ambient listening technology. Verbal consent was obtained by the patient and accompanying visitor(s) for the recording of patient appointment to facilitate this note. I attest to having reviewed and edited the generated note for accuracy, though some syntax or spelling errors may persist. Please contact the author of this note for any clarification.

## 2025-01-28 NOTE — PATIENT INSTRUCTIONS

## 2025-01-29 ENCOUNTER — PATIENT MESSAGE (OUTPATIENT)
Dept: PSYCHIATRY | Facility: CLINIC | Age: 20
End: 2025-01-29
Payer: COMMERCIAL

## 2025-02-07 ENCOUNTER — OFFICE VISIT (OUTPATIENT)
Dept: OPHTHALMOLOGY | Facility: CLINIC | Age: 20
End: 2025-02-07
Payer: COMMERCIAL

## 2025-02-07 DIAGNOSIS — H52.223 REGULAR ASTIGMATISM OF BOTH EYES: ICD-10-CM

## 2025-02-07 DIAGNOSIS — Z01.00 ENCOUNTER FOR EYE EXAM: Primary | ICD-10-CM

## 2025-02-07 DIAGNOSIS — Z00.00 ROUTINE GENERAL MEDICAL EXAMINATION AT A HEALTH CARE FACILITY: ICD-10-CM

## 2025-02-07 PROCEDURE — 99999 PR PBB SHADOW E&M-EST. PATIENT-LVL III: CPT | Mod: PBBFAC,,, | Performed by: OPTOMETRIST

## 2025-02-07 PROCEDURE — 92004 COMPRE OPH EXAM NEW PT 1/>: CPT | Mod: S$GLB,,, | Performed by: OPTOMETRIST

## 2025-02-07 PROCEDURE — 92015 DETERMINE REFRACTIVE STATE: CPT | Mod: S$GLB,,, | Performed by: OPTOMETRIST

## 2025-02-07 PROCEDURE — 1160F RVW MEDS BY RX/DR IN RCRD: CPT | Mod: CPTII,S$GLB,, | Performed by: OPTOMETRIST

## 2025-02-07 PROCEDURE — 1159F MED LIST DOCD IN RCRD: CPT | Mod: CPTII,S$GLB,, | Performed by: OPTOMETRIST

## 2025-02-07 NOTE — PROGRESS NOTES
HPI    Np last updated rx for glasses was about 6 years ago   Rx for reading no longer use them   Feels like her vision has gotten worse   Denies pain and discomfort  Last edited by Fara Rea on 2/7/2025  3:33 PM.            Assessment /Plan     For exam results, see Encounter Report.    1. Encounter for eye exam  Normal fundus exam.  Subclinical Crx, wear PRN.     2. Regular astigmatism of both eyes  Eyeglass Final Rx       Eyeglass Final Rx         Sphere Cylinder Axis    Right +0.25 +0.25 155    Left +0.50 +0.25 080      Type: SVL    Expiration Date: 2/7/2026                    RTC 1 yr for dilated eye exam or sooner if any changes to vision.   Discussed above and answered questions.

## 2025-03-16 DIAGNOSIS — Z30.41 ENCOUNTER FOR SURVEILLANCE OF CONTRACEPTIVE PILLS: ICD-10-CM

## 2025-03-16 NOTE — TELEPHONE ENCOUNTER
No care due was identified.  [facility] Embedded Care Due Messages. Reference number: [id number].   3/16/2025 4:20:12 AM CDT

## 2025-03-16 NOTE — TELEPHONE ENCOUNTER
Refill Routing Note   Medication(s) are not appropriate for processing by [facility] for the following reason(s):      Failed Patient is not an active smoker    [facility] action(s):  Defer Care Due:  None identified     Medication Therapy Plan: Failed Patient is not an active smoker      Appointments  past 12m or future 3m with PCP    Date Provider   Last Visit   12/26/2024 Anamaria Christy MD   Next Visit   Visit date not found Anamaria Christy MD   ED visits in past 90 days: 0        Note composed:5:46 PM 03/16/2025

## 2025-03-17 RX ORDER — NORGESTIMATE AND ETHINYL ESTRADIOL 7DAYSX3 LO
1 KIT ORAL
Qty: 28 TABLET | Refills: 6 | Status: SHIPPED | OUTPATIENT
Start: 2025-03-17

## 2025-05-13 ENCOUNTER — OFFICE VISIT (OUTPATIENT)
Dept: PSYCHIATRY | Facility: CLINIC | Age: 20
End: 2025-05-13
Payer: COMMERCIAL

## 2025-05-13 DIAGNOSIS — F41.9 ANXIETY: Primary | ICD-10-CM

## 2025-05-13 DIAGNOSIS — F99 INSOMNIA DUE TO OTHER MENTAL DISORDER: ICD-10-CM

## 2025-05-13 DIAGNOSIS — F51.05 INSOMNIA DUE TO OTHER MENTAL DISORDER: ICD-10-CM

## 2025-05-13 DIAGNOSIS — F51.5 NIGHTMARES: ICD-10-CM

## 2025-05-13 DIAGNOSIS — F90.2 ATTENTION DEFICIT HYPERACTIVITY DISORDER (ADHD), COMBINED TYPE: ICD-10-CM

## 2025-05-13 DIAGNOSIS — F33.1 MODERATE EPISODE OF RECURRENT MAJOR DEPRESSIVE DISORDER: ICD-10-CM

## 2025-05-13 PROCEDURE — 90863 PHARMACOLOGIC MGMT W/PSYTX: CPT | Mod: 95,,, | Performed by: PSYCHOLOGIST

## 2025-05-13 PROCEDURE — 90832 PSYTX W PT 30 MINUTES: CPT | Mod: 95,,, | Performed by: PSYCHOLOGIST

## 2025-05-13 PROCEDURE — 1159F MED LIST DOCD IN RCRD: CPT | Mod: CPTII,95,, | Performed by: PSYCHOLOGIST

## 2025-05-13 RX ORDER — PRAZOSIN HYDROCHLORIDE 1 MG/1
1 CAPSULE ORAL NIGHTLY
Qty: 30 CAPSULE | Refills: 2 | Status: SHIPPED | OUTPATIENT
Start: 2025-05-13 | End: 2025-08-11

## 2025-05-13 RX ORDER — ESCITALOPRAM OXALATE 10 MG/1
10 TABLET ORAL DAILY
Qty: 30 TABLET | Refills: 2 | Status: SHIPPED | OUTPATIENT
Start: 2025-05-13 | End: 2025-08-11

## 2025-05-13 RX ORDER — TRAZODONE HYDROCHLORIDE 50 MG/1
50 TABLET ORAL NIGHTLY
Qty: 30 TABLET | Refills: 2 | Status: SHIPPED | OUTPATIENT
Start: 2025-05-13

## 2025-05-13 NOTE — PATIENT INSTRUCTIONS
"[facility]PARTMENT OF PSYCHIATRY   PATIENT INFORMATION    We appreciate the opportunity to participate in your medical care and hope the following protocols will make it easier for you to receive quality treatment in our department.    PUNCTUALITY: Your appointment is scheduled for a fixed amount of time, reserved especially for you.  To get the benefit of your appointment, please arrive at least 15 minutes early to allow time for traffic, parking and registration.  Should you arrive more than 15 minutes late to your appointment, you will be rescheduled in order to assure your clinician has adequate time to assess you and provide helpful care.      APPOINTMENTS: Appointments are made by the nursing/front office staff or through the patient portal. Providers do not have access  to schedule appointments. Walk in appointments are not available. FOR EMERGENCIES, PLEASE GO THE CLOSEST EMERGENCY ROOM.    CANCELLATION/MISSED APPOINTMENTS:   In order to receive quality care, all appointments must be kept.  If you are unable to keep an appointment, please reschedule at least 3 days prior if possible. Late cancellations (within 24 hours of the appointment) and repeated no-show appointments may result in dismissal from the clinic. After two no show/late cancellation visits, you will receive a notice letter, alerting you to keep visits to prevent department dismissal. If another visit is missed after receipt of the notice, you will be discharged from the clinic. This policy is in effect to allow for other individuals on a long waiting list to be seen as soon as possible. Unlike other branches of medicine where several individuals can be scheduled in a 30 minute time slot, only one individual can be scheduled in any time slot in Psychiatry.     MESSAGES: For simple questions/concerns, you may contact your individual providers electronically through the "[facility]" portal or by calling [phone] " with messages relayed via office staff. If relevant, include pharmacy name and phone number, date of last visit and next scheduled visit, phone number where you can be reached throughout the day, and whether leaving a voicemail or message on an answering machine is acceptable. Messages will be returned by the Medical Assistant or Office Staff after your provider has reviewed the message.  Please allow 24 hours for a returned voicemail message before leaving another voicemail message. Voicemail messages will be checked each workday (Monday through Friday) during office hours (8:00 a.m. and 5:00 p.m.) and returned at most within one business day.  You may leave a non-urgent message after hours. Note that psychotherapy and medication management are not appropriate by telephone or the patient portal. Portal messages may take up to 72 hours for a direct response from your provider.    PRESCRIPTION REFILLS:  Please communicate with your prescriber about any refills you need during your appointment. You may also request refills through the [facility] portal (preferred) or by calling the clinic. Prescriptions will be filled during office hours.     Please do not wait until you are completely out of medication to request refills. Same day refills are not always possible. Patients may experience symptoms of withdrawal if they run out of medications. The patient assumes all responsibility when there is an issue with non-compliance with follow-up appointments and medications.  Some medications are controlled and regulated by the FDA and SERGE[employer]. Some of these medications can not be refilled before 30 days and require a face to face appointment.     PAPERWORK REQUESTS: If you have any forms or letters that need to be completed by your doctor, please present these at the beginning of the appointment to ensure that information needed to complete them is obtained during the office visit. Paperwork is completed during office visits  "only.    SPECIAL EVALUATIONS: Please note that our department is treatment-focused. As such, we focus on treatment-oriented evaluations and do not perform specialty or "forensic" evaluations. Examples are listed below.    Disability: We do not do disability evaluations.  Please contact Social Security Administration for evaluations and determinations. You will then sign releases allowing for records from your treatment providers to be forwarded to Social Security Administration to use in their evaluation.  Gun Permit: We do not offer Sound Judgment Evaluations or assessments leading to gun ownership, nor do we fill out or file paperwork relevant to owning, concealing or purchasing a firearm.  Emotional Support     Animals (KARLI): We do not provide documentation, including letters, to aid in the acclamation that an Emotional Support Animal is required. Note that ESAs are not trained to perform tasks or recognize particular signs or symptoms. Rather, they are distinguished by the close, emotional, and supportive bond between the animal and the owner.       SAMPLES: We do not provide samples of any medications. If you have financial difficulties and are on a limited income, you may qualify for Patient Assistance Programs from various pharmaceutical companies. This will require that you complete paperwork with your financial information, but this does not guarantee that the company will approve the application. Alternative medication options can be discussed. Some companies offer vouchers or coupons for discounts.    REFERRALS/COORDINATION: You will be referred to other providers if we feel unable to adequately diagnose or treat your particular condition, or if collaboration with another provider would allow for better management of your condition.    DR. PENNINGTON'S SCHEDULE/HOURS:    Monday 7:30 - 12:00; 1-4:30 Tuesday 7:30 - 12:00; 1-5:30 Wednesday 7:30 - 12:00; 1-5:30 Thursday 7:30 - 12:00; 1-5:30 Friday 7:30 - " 12:30     Call In if problems  Call Report Side Effects   Encouraged to follow up with primary care / Gen Med MD for continued monitoring of general health and wellness  Call 911 Or go to ER if Acute Concerns (especially if any thoughts of harm to self or other)    This document is for information purposes only. Please refer to the full disclaimer and copyright statement available at http://www.cci.health.wa.gov.au regarding the information from this website before making use of such information.  See website www.[employer].EGEN.wa.gov.au for more handouts and resources.    What is Sleep Hygiene?  'Sleep hygiene' is the term used to describe good sleep habits. Considerable research has gone into developing a set of guidelines and tips which are designed to enhance good sleeping, and there is much evidence to suggest that these strategies can provide long-term solutions to sleep difficulties. There are many medications which are used to treat insomnia, but these tend to be only effective in the short-term. Ongoing use of sleeping pills may lead to dependence and interfere with developing good sleep habits independent of medication, thereby prolonging sleep difficulties. Talk to your health professional about what is right for you, but we recommend good sleep hygiene as an important part of treating insomnia, either with other strategies such as medication or cognitive therapy or alone.    Sleep Hygiene Tips  1) Get regular. One of the best ways to train your body to sleep well is to go to bed and get up at more or less the same time every day, even on weekends and days off! This regular rhythm will make you feel better and will give your body something to work from.  2) Sleep when sleepy. Only try to sleep when you actually feel tired or sleepy, rather than spending too much time awake in bed.  3) Get up & try again. If you haven't been able to get to sleep after about 20 minutes or more, get up and do something calming  or boring until you feel sleepy, then return to bed and try again. Sit quietly on the couch with the lights off (bright light will tell your brain that it is time to wake up), or read something boring like the phone book. Avoid doing anything that is too stimulating or interesting, as this will wake you up even more.  4) Avoid caffeine & nicotine. It is best to avoid consuming any caffeine (in coffee, tea, cola drinks, chocolate, and some medications) or nicotine (cigarettes) for at least 4-6 hours before going to bed. These substances act as stimulants and interfere with the ability to fall asleep   5) Avoid alcohol. It is also best to avoid alcohol for at least 4-6 hours before going to bed. Many people believe that alcohol is relaxing and helps them to get to sleep at first, but it actually interrupts the quality of sleep.  6) Bed is for sleeping. Try not to use your bed for anything other than sleeping and sex, so that your body comes to associate bed with sleep. If you use bed as a place to watch TV, eat, read, work on your laptop, pay bills, and other things, your body will not learn this connection.  7) No naps. It is best to avoid taking naps during the day, to make sure that you are tired at bedtime. If you can't make it through the day without a nap, make sure it is for less than an hour and before 3pm.  8) Sleep rituals. You can develop your own rituals of things to remind your body that it is time to sleep - some people find it useful to do relaxing stretches or breathing exercises for 15 minutes before bed each night, or sit calmly with a cup of caffeine-free tea.  9) Bathtime. Having a hot bath 1-2 hours before bedtime can be useful, as it will raise your body temperature, causing you to feel sleepy as your body temperature drops again. Research shows that sleepiness is associated with a drop in body temperature.  10) No clock-watching. Many people who struggle with sleep tend to watch the clock too  much. Frequently checking the clock during the night can wake you up (especially if you turn on the light to read the time) and reinforces negative thoughts such as Oh no, look how late it is, I'll never get to sleep or it's so early, I have only slept for 5 hours, this is terrible.  11) Use a sleep diary. This worksheet can be a useful way of making sure you have the right facts about your sleep, rather than making assumptions. Because a diary involves watching the clock (see point 10) it is a good idea to only use it for two weeks to get an idea of what is going and then perhaps two months down the track to see how you are progressing.  12) Exercise. Regular exercise is a good idea to help with good sleep, but try not to do strenuous exercise in the 4 hours before bedtime. Morning walks are a great way to start the day feeling refreshed!  13) Eat right. A healthy, balanced diet will help you to sleep well, but timing is important. Some people find that a very empty stomach at bedtime is distracting, so it can be useful to have a light snack, but a heavy meal soon before bed can also interrupt sleep. Some people recommend a warm glass of milk, which contains tryptophan, which acts as a natural sleep inducer.  14) The right space. It is very important that your bed and bedroom are quiet and comfortable for sleeping. A cooler room with enough blankets to stay warm is best, and make sure you have curtains or an eyemask to block out early morning light and earplugs if there is noise outside your room.  15) Keep daytime routine the same. Even if you have a bad night sleep and are tired it is important that you try to keep your daytime activities the same as you had planned. That is, don't avoid activities because you feel tired. This can reinforce the insomnia.    https://www.veterant[employer].va.gov/sleep/index.asp  https://[employer].va.gov/steve/cbt-i-         Charis Childress, PhD,   [facility] Medical  Psychologist  [facility]  [address]  [place] [id number] [phone]   [phone] fax

## 2025-05-13 NOTE — PROGRESS NOTES
Outpatient Psychiatry Follow-Up Visit    5/13/2025    Virtual Visit    The patient location is: Patient's home/ Patient reported that his/her location at the time of this visit was in the [facility]     Visit type: Virtual visit with synchronous audio and video     Each patient to whom he or she provides medical services by telehealth is: (1) informed of the relationship between the medical psychologist and patient and the respective role of any other health care provider with respect to management of the patient; and (2) notified that he or she may decline to receive medical services by telehealth and may withdraw from such care at any time.    I also informed patient of the following:   Charis Childress, PhD, MPAP:  [place] medical license number: [id number]    My contact info:  [facility] / 2nd Floor  [address] [address] [place] [address]   Ph: [phone]    If technology issues, call office phone: Ph: [phone] or [phone]  If crisis: Dial 911 or go to nearest Emergency Room (ER)  If questions related to privacy practices: contact [facility] Information Department: [phone]    Preferred Name: Freddie  Gender Identity: cis female  Preferred Pronouns: she/her      History of Present Illness    CHIEF COMPLAINT:  Freddie presents for a follow-up visit regarding depression, anxiety, nightmares, and ADHD, last seen in January.    HPI:  Ferddie reports feeling sad sometimes, particularly when alone. She indicates having little interest or pleasure in doing things nearly every day, and feeling down, depressed, or hopeless more than half the days over the past two weeks. She also reports feeling bad about herself or that she's a failure nearly every day. These symptoms have made it somewhat difficult for her to engage in daily activities and social interactions.    Freddie's nightmares have decreased in frequency to approximately once a week, with unchanged content. She  reports difficulty with sleep hygiene, often staying in bed from 10 PM to 1 AM engaged in non-sleep activities before falling asleep. She wakes up at 6 AM for school drop-off duties but often takes a nap afterward.    Freddie is currently employed at [employer], supplementing income with [employer]. She reports financial stability but mentions difficulty in saving money due to work-related expenses such as gas and food purchases during shifts. She plans to continue working through the summer, save money for tuition, and return to [employer] for college in the fall, commuting from her mother's house.    Freddie is living with her mother and helping care for a 10-year-old brother, including school drop-offs. She reports a somewhat strained relationship with the brother due to different parenting styles between generations. Her ability to see her father is limited by her schedule and mother's preferences.    rFeddie reports being single with no current interest in dating. Social interactions with friends have been limited, seeing friends from [place] only about 3 times since the new year.    Freddie reports experiencing excruciating pain in her feet and back after work shifts.    Freddie mentioned having thoughts that she'd be better off dead or of hurting herself on one day, coinciding with learning about her grandfather's lung cancer diagnosis. She denies having trouble falling or staying asleep, or sleeping too much. She denies having trouble concentrating on things like reading a book or watching TV.    MEDICATIONS:  Freddie is on Prazosin 1 mg taken at night for nightmares, Lexapro 10 mg taken daily for depression and anxiety, and Trazodone 50 mg taken at night on weekdays for sleep. She takes Vyvanse occasionally before work for ADHD symptoms, having discontinued regular use since not being in school. Freddie is also on daily birth control.    FAMILY HISTORY:  Family history is significant for grandfather who was recently  diagnosed with lung cancer.    TESTS:  Freddie underwent a PHQ-9 assessment during the current visit. The score indicates that she falls within the moderate depression range.    LIFESTYLE:  Freddie plans to return to [employer] for college in the fall. She is considering commuting from her mother's house due to rising tuition prices for housing and boarding. She previously took Vyvanse for school but has not been using it since she's not currently in school. Freddie is currently employed at [employer], picking up extra shifts when available. She also does [employer] on the side. She reports foot and back pain after work shifts. Freddie reports being single and not currently interested in dating. She is currently living at her mother's house and helps take care of her younger brother, including taking him to school. To relax after work, she watches movies and reads books in bed. Freddie infrequently socializes with friends. Her friends have noted that she has not been engaging with them lately in group chats. She reports feeling sad sometimes when alone.    ROS:  Constitutional: +fatigue  Gastrointestinal: +food binging, +appetite changes  Musculoskeletal: +back pain, +limb pain  Psychiatric: +depression, +loss of pleasure from usual activities, +suicidal ideation, +nightmares, +hopelessness       PSYCHIATRIC: Pertinant items are noted in the narrative.    Past Medical, Family and Social History: The patient's past medical, family and social history have been reviewed and updated as appropriate within the electronic medical record - see encounter notes.          5/13/2025     6:36 AM 1/25/2025     9:57 PM 9/30/2024    12:28 PM   GAD7   1. Feeling nervous, anxious, or on edge? 2 3 3   2. Not being able to stop or control worrying? 2 3 3   3. Worrying too much about different things? 2 3 3   4. Trouble relaxing? 2 2 3   5. Being so restless that it is hard to sit still? 0 0 3   6. Becoming easily annoyed or irritable? 1 1 3    7. Feeling afraid as if something awful might happen? 3 3 3   8. If you checked off any problems, how difficult have these problems made it for you to do your work, take care of things at home, or get along with other people? 1 0 3   JOE-7 Score 12  15  21        Patient-reported      0-4 = Minimal anxiety  5-9 = Mild anxiety  10-14 = Moderate anxiety  15-21 = Severe anxiety         5/13/2025     7:53 AM 12/26/2024     3:08 PM 10/26/2021     4:00 PM   Depression Patient Health Questionnaire   Over the last two weeks how often have you been bothered by little interest or pleasure in doing things Nearly every day More than half the days Not at all    Over the last two weeks how often have you been bothered by feeling down, depressed or hopeless More than half the days Several days Several days    PHQ-2 Total Score 5 3 1   Over the last two weeks how often have you been bothered by trouble falling or staying asleep, or sleeping too much Not at all  Several days    Over the last two weeks how often have you been bothered by feeling tired or having little energy Several days  Not at all    Over the last two weeks how often have you been bothered by a poor appetite or overeating Nearly every day  Nearly every day    Over the last two weeks how often have you been bothered by feeling bad about yourself - or that you are a failure or have let yourself or your family down Nearly every day  More than half the days    Over the last two weeks how often have you been bothered by trouble concentrating on things, such as reading the newspaper or watching television Not at all  Not at all    Over the last two weeks how often have you been bothered by moving or speaking so slowly that other people could have noticed. Or the opposite - being so fidgety or restless that you have been moving around a lot more than usual. Not at all  More than half the days    Over the last two weeks how often have you been bothered by thoughts that  "you would be better off dead, or of hurting yourself Several days  Several days    If you checked off any problems, how difficult have these problems made it for you to do your work, take care of things at home or get along with other people? Somewhat difficult  Not difficult at all    PHQ-9 Score 13  10    PHQ-9 Interpretation Moderate  Moderate        Data saved with a previous flowsheet row definition     0-4 = No intervention  5 to 9 = Mild  10 to 14 = Moderate  15 to 19 = Moderately severe  =20 = Severe    Risk Parameters:  Patient reports suicidal ideation: thought about it once in last 2 weeks--no plan or intent  Patient reports no homicidal ideation  Patient reports no self-injurious behavior  Patient reports no violent behavior    Exam (detailed: at least 9 elements; comprehensive: all 15 elements)   Constitutional  Vitals:  Most recent vital signs were reviewed.   Last 3 sets of Vitals        8/23/2024     4:18 PM 9/1/2024    11:57 AM 12/26/2024     3:07 PM   Vitals - 1 value per visit   SYSTOLIC 128 120 118   DIASTOLIC 86 73 74   Pulse 112 110 89   Temp  99 °F (37.2 °C) 98.8 °F (37.1 °C)   Resp  20    SPO2  98 % 97 %   Weight (lb) 191.58 185 176.15   Weight (kg) 86.9 83.915 79.9   Height 5' 7" (1.702 m) 5' 7" (1.702 m) 5' 7" (1.702 m)   BMI (Calculated) 30 29 27.6   Pain Score Zero Five Zero          General:  age appropriate, casually dressed, neatly groomed, scarf over hair     Musculoskeletal  Muscle Strength/Tone:  no tremor, no tic   Gait & Station:  video visit     Psychiatric  Speech:  no latency; no press   Behavior: wnl   Mood & Affect:  anxious, depressed  congruent and appropriate   Thought Process:  normal and logical   Associations:  intact   Thought Content:  normal, no suicidality, no homicidality, delusions, or paranoia   Insight:  has awareness of illness   Judgement: behavior is adequate to circumstances   Orientation:  grossly intact   Memory: intact for content of interview   Language: " grossly intact   Attention Span & Concentration:  Grossly intact   Fund of Knowledge:  intact and appropriate to age and level of education     General Impression:       ICD-10-CM ICD-9-CM   1. Anxiety  F41.9 300.00   2. Moderate episode of recurrent major depressive disorder  F33.1 296.32   3. Nightmares  F51.5 307.47   4. Insomnia due to other mental disorder  F51.05 300.9    F99 327.02   5. Attention deficit hyperactivity disorder (ADHD), combined type  F90.2 314.01        Assessment & Plan    Assessed depression as moderate based on PHQ-9 screening and report of symptoms.  Will hold off on Vyvanse prescription until school resumes in the fall.    PLAN SUMMARY:   Continue Lexapro 10 mg daily for depression and anxiety   Continue Prazosin 1 mg at night for nightmares   Continue Trazodone 50 mg as needed for sleep   Implement sleep hygiene improvements to address sleep onset issues   Create a budget to track expenses and save money for tuition   Explore options for making coffee at home or using work Keurig   Prepare meals at home to bring to work   Consider purchasing bulk snacks for work breaks   Follow up in early August, before school starts (in-person or virtual based on patient preference)    F33.1 MAJOR DEPRESSIVE DISORDER, RECURRENT, MODERATE/F41.9 ANXIETY DISORDER, UNSPECIFIED:   Continued Lexapro 10 mg daily for depression and anxiety.    F51.5 NIGHTMARE DISORDER:   Continued Prazosin 1 mg at night for nightmares, which have decreased in frequency.    F51.05 INSOMNIA, DUE TO ANOTHER CONDITION:   Recommend sleep hygiene improvements to address prolonged time in bed before sleep onset, including avoiding reading or watching movies in bed and using the bed only for sleep to improve sleep onset and quality.   Provided information on sleep hygiene tactics in the after-visit summary, including links for reference.   Continued Trazodone 50 mg as needed for sleep.    LIFESTYLE CHANGES:   Discussed the benefits of  preparing meals at home and bringing them to work to save money and improve nutrition.   Consider purchasing granola bars or snacks in bulk to keep in car for work breaks.   Explore options for making coffee at home or using the Keurig at work to reduce [employer] expenses.   Create a budget to track expenses and save money for tuition.    FOLLOW-UP:   Follow up in early August, before school starts.   Freddie to choose between in-person or virtual appointment based on preference and availability.         I spent an additional 18 minutes performing E/M services with >50% spent on counseling, guidance, coordinating care (not Psychotherapy related) in addition to the 16 minutes performing Psychotherapy.    I spent a total of 34 minutes on the day of the visit. This includes face to face time and non-face to face time preparing to see the patient (eg, review of tests), obtaining and/or reviewing separately obtained history, documenting clinical information in the electronic or other health record, independently interpreting results and communicating results to the patient/family/caregiver, or care coordinator.        Charis Childress, PhD, MP  Advanced Practice Medical Psychologist  [facility]--[address]  [place] [address]  [phone]   [phone] fax    This note was generated with the assistance of ambient listening technology. Verbal consent was obtained by the patient and accompanying visitor(s) for the recording of patient appointment to facilitate this note. I attest to having reviewed and edited the generated note for accuracy, though some syntax or spelling errors may persist. Please contact the author of this note for any clarification.